# Patient Record
Sex: MALE | Race: WHITE | NOT HISPANIC OR LATINO | Employment: UNEMPLOYED | RURAL
[De-identification: names, ages, dates, MRNs, and addresses within clinical notes are randomized per-mention and may not be internally consistent; named-entity substitution may affect disease eponyms.]

---

## 2019-07-12 ENCOUNTER — TELEPHONE (OUTPATIENT)
Dept: FAMILY MEDICINE CLINIC | Facility: CLINIC | Age: 49
End: 2019-07-12

## 2019-07-18 PROBLEM — E11.9 TYPE 2 DIABETES MELLITUS WITHOUT COMPLICATION, WITHOUT LONG-TERM CURRENT USE OF INSULIN (HCC): Status: ACTIVE | Noted: 2019-07-18

## 2019-07-18 PROBLEM — I10 HYPERTENSION: Status: ACTIVE | Noted: 2019-07-18

## 2019-07-18 PROBLEM — L30.9 ECZEMA: Status: ACTIVE | Noted: 2019-07-18

## 2019-07-18 PROBLEM — Q40.1 CONGENITAL HIATUS HERNIA: Status: ACTIVE | Noted: 2019-07-18

## 2019-07-18 PROBLEM — E78.5 HYPERLIPIDEMIA: Status: ACTIVE | Noted: 2019-07-18

## 2019-07-18 PROBLEM — Z72.0 TOBACCO USE: Status: ACTIVE | Noted: 2019-07-18

## 2021-02-03 ENCOUNTER — HOSPITAL ENCOUNTER (OUTPATIENT)
Dept: GENERAL RADIOLOGY | Facility: HOSPITAL | Age: 51
Discharge: HOME | End: 2021-02-03

## 2021-02-03 ENCOUNTER — OFFICE VISIT (OUTPATIENT)
Dept: CARDIOLOGY | Facility: CLINIC | Age: 51
End: 2021-02-03

## 2021-02-03 ENCOUNTER — LAB (OUTPATIENT)
Dept: LAB | Facility: HOSPITAL | Age: 51
End: 2021-02-03

## 2021-02-03 ENCOUNTER — TRANSCRIBE ORDERS (OUTPATIENT)
Dept: ADMINISTRATIVE | Facility: HOSPITAL | Age: 51
End: 2021-02-03

## 2021-02-03 ENCOUNTER — TRANSCRIBE ORDERS (OUTPATIENT)
Dept: LAB | Facility: HOSPITAL | Age: 51
End: 2021-02-03

## 2021-02-03 VITALS
BODY MASS INDEX: 22.97 KG/M2 | HEART RATE: 130 BPM | DIASTOLIC BLOOD PRESSURE: 68 MMHG | WEIGHT: 179 LBS | SYSTOLIC BLOOD PRESSURE: 102 MMHG | HEIGHT: 74 IN

## 2021-02-03 DIAGNOSIS — E11.9 TYPE 2 DIABETES MELLITUS WITHOUT COMPLICATION, WITHOUT LONG-TERM CURRENT USE OF INSULIN (HCC): ICD-10-CM

## 2021-02-03 DIAGNOSIS — E11.65 UNCONTROLLED TYPE 2 DIABETES MELLITUS WITH HYPERGLYCEMIA (HCC): Primary | ICD-10-CM

## 2021-02-03 DIAGNOSIS — R94.31 ABNORMAL EKG: ICD-10-CM

## 2021-02-03 DIAGNOSIS — E78.2 MIXED HYPERLIPIDEMIA: ICD-10-CM

## 2021-02-03 DIAGNOSIS — R07.9 CHEST PAIN, UNSPECIFIED TYPE: ICD-10-CM

## 2021-02-03 DIAGNOSIS — R10.9 ABDOMINAL PAIN IN MALE: ICD-10-CM

## 2021-02-03 DIAGNOSIS — M54.50 ACUTE BILATERAL LOW BACK PAIN WITHOUT SCIATICA: ICD-10-CM

## 2021-02-03 DIAGNOSIS — R00.0 TACHYCARDIA: ICD-10-CM

## 2021-02-03 DIAGNOSIS — R10.9 ABDOMINAL PAIN IN MALE: Primary | ICD-10-CM

## 2021-02-03 DIAGNOSIS — R10.9 STOMACH ACHE: ICD-10-CM

## 2021-02-03 DIAGNOSIS — I10 ESSENTIAL HYPERTENSION: Primary | ICD-10-CM

## 2021-02-03 DIAGNOSIS — E11.65 UNCONTROLLED TYPE 2 DIABETES MELLITUS WITH HYPERGLYCEMIA (HCC): ICD-10-CM

## 2021-02-03 DIAGNOSIS — R06.02 SHORTNESS OF BREATH: ICD-10-CM

## 2021-02-03 LAB
ALBUMIN SERPL-MCNC: 4.6 G/DL (ref 3.5–5.2)
ALBUMIN/GLOB SERPL: 1.5 G/DL
ALP SERPL-CCNC: 126 U/L (ref 39–117)
ALT SERPL W P-5'-P-CCNC: 16 U/L (ref 1–41)
ANION GAP SERPL CALCULATED.3IONS-SCNC: 12.3 MMOL/L (ref 5–15)
AST SERPL-CCNC: 14 U/L (ref 1–40)
BASOPHILS # BLD AUTO: 0.06 10*3/MM3 (ref 0–0.2)
BASOPHILS NFR BLD AUTO: 0.5 % (ref 0–1.5)
BILIRUB SERPL-MCNC: 0.6 MG/DL (ref 0–1.2)
BUN SERPL-MCNC: 22 MG/DL (ref 6–20)
BUN/CREAT SERPL: 21.4 (ref 7–25)
CALCIUM SPEC-SCNC: 10.1 MG/DL (ref 8.6–10.5)
CHLORIDE SERPL-SCNC: 90 MMOL/L (ref 98–107)
CHOLEST SERPL-MCNC: 197 MG/DL (ref 0–200)
CO2 SERPL-SCNC: 33.7 MMOL/L (ref 22–29)
CREAT SERPL-MCNC: 1.03 MG/DL (ref 0.76–1.27)
DEPRECATED RDW RBC AUTO: 40.4 FL (ref 37–54)
EOSINOPHIL # BLD AUTO: 0.12 10*3/MM3 (ref 0–0.4)
EOSINOPHIL NFR BLD AUTO: 0.9 % (ref 0.3–6.2)
ERYTHROCYTE [DISTWIDTH] IN BLOOD BY AUTOMATED COUNT: 12.2 % (ref 12.3–15.4)
ERYTHROCYTE [SEDIMENTATION RATE] IN BLOOD: 3 MM/HR (ref 0–20)
GFR SERPL CREATININE-BSD FRML MDRD: 76 ML/MIN/1.73
GLOBULIN UR ELPH-MCNC: 3.1 GM/DL
GLUCOSE SERPL-MCNC: 379 MG/DL (ref 65–99)
HBA1C MFR BLD: 8.8 % (ref 3.5–5.6)
HCT VFR BLD AUTO: 50.5 % (ref 37.5–51)
HDLC SERPL-MCNC: 42 MG/DL (ref 40–60)
HGB BLD-MCNC: 17.2 G/DL (ref 13–17.7)
IMM GRANULOCYTES # BLD AUTO: 0.05 10*3/MM3 (ref 0–0.05)
IMM GRANULOCYTES NFR BLD AUTO: 0.4 % (ref 0–0.5)
LDLC SERPL CALC-MCNC: 100 MG/DL (ref 0–100)
LDLC/HDLC SERPL: 2.14 {RATIO}
LYMPHOCYTES # BLD AUTO: 2.01 10*3/MM3 (ref 0.7–3.1)
LYMPHOCYTES NFR BLD AUTO: 15.9 % (ref 19.6–45.3)
MCH RBC QN AUTO: 31.2 PG (ref 26.6–33)
MCHC RBC AUTO-ENTMCNC: 34.1 G/DL (ref 31.5–35.7)
MCV RBC AUTO: 91.5 FL (ref 79–97)
MONOCYTES # BLD AUTO: 1.07 10*3/MM3 (ref 0.1–0.9)
MONOCYTES NFR BLD AUTO: 8.4 % (ref 5–12)
NEUTROPHILS NFR BLD AUTO: 73.9 % (ref 42.7–76)
NEUTROPHILS NFR BLD AUTO: 9.36 10*3/MM3 (ref 1.7–7)
NRBC BLD AUTO-RTO: 0 /100 WBC (ref 0–0.2)
PLATELET # BLD AUTO: 219 10*3/MM3 (ref 140–450)
PMV BLD AUTO: 11.7 FL (ref 6–12)
POTASSIUM SERPL-SCNC: 4.1 MMOL/L (ref 3.5–5.2)
PROT SERPL-MCNC: 7.7 G/DL (ref 6–8.5)
RBC # BLD AUTO: 5.52 10*6/MM3 (ref 4.14–5.8)
SODIUM SERPL-SCNC: 136 MMOL/L (ref 136–145)
TRIGL SERPL-MCNC: 325 MG/DL (ref 0–150)
VLDLC SERPL-MCNC: 55 MG/DL (ref 5–40)
WBC # BLD AUTO: 12.67 10*3/MM3 (ref 3.4–10.8)

## 2021-02-03 PROCEDURE — 85025 COMPLETE CBC W/AUTO DIFF WBC: CPT

## 2021-02-03 PROCEDURE — 99204 OFFICE O/P NEW MOD 45 MIN: CPT | Performed by: INTERNAL MEDICINE

## 2021-02-03 PROCEDURE — 74018 RADEX ABDOMEN 1 VIEW: CPT

## 2021-02-03 PROCEDURE — 72100 X-RAY EXAM L-S SPINE 2/3 VWS: CPT

## 2021-02-03 PROCEDURE — 85652 RBC SED RATE AUTOMATED: CPT

## 2021-02-03 PROCEDURE — 80061 LIPID PANEL: CPT

## 2021-02-03 PROCEDURE — 83036 HEMOGLOBIN GLYCOSYLATED A1C: CPT

## 2021-02-03 PROCEDURE — 36415 COLL VENOUS BLD VENIPUNCTURE: CPT

## 2021-02-03 PROCEDURE — 80053 COMPREHEN METABOLIC PANEL: CPT

## 2021-02-03 RX ORDER — AMLODIPINE BESYLATE 2.5 MG/1
2.5 TABLET ORAL DAILY
COMMUNITY
End: 2021-02-03

## 2021-02-03 RX ORDER — GLIMEPIRIDE 2 MG/1
2 TABLET ORAL
COMMUNITY

## 2021-02-03 RX ORDER — LISINOPRIL AND HYDROCHLOROTHIAZIDE 25; 20 MG/1; MG/1
1 TABLET ORAL DAILY
COMMUNITY
End: 2021-02-03

## 2021-02-03 RX ORDER — BUPROPION HYDROCHLORIDE 150 MG/1
150 TABLET ORAL DAILY
COMMUNITY

## 2021-02-03 RX ORDER — LISINOPRIL AND HYDROCHLOROTHIAZIDE 25; 20 MG/1; MG/1
1 TABLET ORAL DAILY
Status: SHIPPED | COMMUNITY
Start: 2021-02-03

## 2021-02-03 NOTE — PROGRESS NOTES
Date of Office Visit: 2021  Encounter Provider: Dr. Winston Matos  Place of Service: [facility] CARDIOLOGY [place]  Patient Name: Winston Benites  :1970  Roxanna Portillo MD    Chief Complaint   Patient presents with   • Rapid Heart Rate     consult   • Chest Pain   • Hypertension   • Hyperlipidemia     History of Present Illness:    I am pleased to see Mr. Benites in my office today as a new consultation.    As you know, patient is 50-year-old white gentleman whose past medical history significant for hypertension, hyperlipidemia, diabetes mellitus, who is referred to me for symptom of chest pain and palpitation.    Patient has longstanding history of diabetes mellitus.  Patient reports that from last few weeks he is having symptom of chest pain.  Patient described this as a dull ache on the left side of the chest with radiation to the both shoulder and left arm.  There is no correlation with exertion.  Patient had few episode at rest as well as at exercise.  Patient denies any orthopnea PND syncope or presyncope.  No leg edema noted.  Patient denies any dizziness and lightheadedness.    Patient does not have previous history of CAD, PCI or MI.  Patient smokes half to 1 pack cigarettes per day.  He socially drinks.  He has strong family history for premature CAD in his father  of massive heart attack.    EKG done at PCP office shows sinus tachycardia with heart rate of 123 bpm.    Patient is presented with symptom of palpitation and chest pain.  His blood pressure is low.  I would recommend to discontinue amlodipine.  I would decrease lisinopril to 10 mg daily.  I would proceed with Lopressor 25 mg twice daily.  I would recommend to proceed with echocardiogram and stress test        Past Medical History:   Diagnosis Date   • Abnormal ECG    • Diabetes mellitus type 2, controlled (CMS/[facility])    • Eczema    • Hyperlipidemia    • Hypertension          Past Surgical History:   Procedure Laterality Date   •  "APPENDECTOMY     • HERNIA REPAIR             Current Outpatient Medications:   •  buPROPion XL (WELLBUTRIN XL) 150 MG 24 hr tablet, Take 150 mg by mouth Daily., Disp: , Rfl:   •  glimepiride (AMARYL) 2 MG tablet, Take 2 mg by mouth Every Morning Before Breakfast., Disp: , Rfl:   •  lisinopril-hydrochlorothiazide (PRINZIDE,ZESTORETIC) 20-25 MG per tablet, Take 0.5 tablets by mouth Daily., Disp: , Rfl:   •  metFORMIN (GLUCOPHAGE) 500 MG tablet, Take 500 mg by mouth 3 (Three) Times a Day., Disp: , Rfl:       Social History     Socioeconomic History   • Marital status: Single     Spouse name: Not on file   • Number of children: Not on file   • Years of education: Not on file   • Highest education level: Not on file   Tobacco Use   • Smoking status: Current Every Day Smoker     Packs/day: 1.00   • Smokeless tobacco: Never Used   Substance and Sexual Activity   • Alcohol use: Yes     Frequency: Never   • Drug use: No   • Sexual activity: Defer         Review of Systems   Constitution: Negative for chills and fever.   HENT: Negative for ear discharge and nosebleeds.    Eyes: Negative for discharge and redness.   Cardiovascular: Positive for chest pain and palpitations. Negative for orthopnea, paroxysmal nocturnal dyspnea and syncope.   Respiratory: Positive for shortness of breath. Negative for cough and wheezing.    Endocrine: Negative for heat intolerance.   Skin: Negative for rash.   Musculoskeletal: Negative for arthritis and myalgias.   Gastrointestinal: Negative for abdominal pain, melena, nausea and vomiting.   Genitourinary: Negative for dysuria and hematuria.   Neurological: Negative for dizziness, light-headedness, numbness and tremors.   Psychiatric/Behavioral: Negative for depression. The patient is not nervous/anxious.        Procedures    Procedures    No orders to display           Objective:    /68   Pulse (!) 130   Ht 188 cm (74.02\")   Wt 81.2 kg (179 lb)   BMI 22.97 kg/m²         Constitutional: "       Appearance: Well-developed.   Eyes:      General: No scleral icterus.        Right eye: No discharge.   HENT:      Head: Normocephalic and atraumatic.   Neck:      Thyroid: No thyromegaly.      Lymphadenopathy: No cervical adenopathy.   Pulmonary:      Effort: Pulmonary effort is normal. No respiratory distress.      Breath sounds: Normal breath sounds. No wheezing. No rales.   Cardiovascular:      Normal rate. Regular rhythm.      No gallop.   Abdominal:      Tenderness: There is no abdominal tenderness.   Skin:     Findings: No erythema or rash.   Neurological:      Mental Status: Alert and oriented to person, place, and time.             Assessment:       Diagnosis Plan   1. Essential hypertension  Adult Transthoracic Echo Complete W/ Cont if Necessary Per Protocol    Stress Test With Myocardial Perfusion One Day   2. Type 2 diabetes mellitus without complication, without long-term current use of insulin (CMS/HCC)  Adult Transthoracic Echo Complete W/ Cont if Necessary Per Protocol    Stress Test With Myocardial Perfusion One Day   3. Mixed hyperlipidemia  Adult Transthoracic Echo Complete W/ Cont if Necessary Per Protocol    Stress Test With Myocardial Perfusion One Day   4. Chest pain, unspecified type  Adult Transthoracic Echo Complete W/ Cont if Necessary Per Protocol    Stress Test With Myocardial Perfusion One Day   5. Abnormal EKG  Adult Transthoracic Echo Complete W/ Cont if Necessary Per Protocol    Stress Test With Myocardial Perfusion One Day   6. Tachycardia  Adult Transthoracic Echo Complete W/ Cont if Necessary Per Protocol    Stress Test With Myocardial Perfusion One Day   7. Shortness of breath  Adult Transthoracic Echo Complete W/ Cont if Necessary Per Protocol    Stress Test With Myocardial Perfusion One Day            Plan:       MDM/assessment and plan:    1.  Chest pain:    I would recommend to proceed with stress test with Cardiolite imaging.  Echocardiogram would be done    2.   Shortness of breath:    I would recommend that patient should proceed with echocardiogram.  Patient gives a history of previous cardiac murmur.    3.  Abnormal EKG:    Patient has underlying sinus tachycardia.  Etiology is unclear.  I would recommend echocardiogram to assess the left ventricle function and rule out cardiomyopathy    4.  Sinus tachycardia:    I would recommend to start Lopressor 25 mg twice daily.    5.  Hypertension:    I would like to add beta-blocker in the antihypertensive regimen.  I would recommend to decrease lisinopril.  Amlodipine would be discontinued.    6.  Diabetes mellitus:    Further recommendation as per PCP.  Diet modification discussed

## 2021-02-16 RX ORDER — ATENOLOL 25 MG/1
TABLET ORAL
COMMUNITY
Start: 2021-02-03 | End: 2021-03-19

## 2021-02-16 RX ORDER — ATORVASTATIN CALCIUM 10 MG/1
TABLET, FILM COATED ORAL
COMMUNITY
Start: 2021-02-03

## 2021-02-23 ENCOUNTER — HOSPITAL ENCOUNTER (OUTPATIENT)
Dept: NUCLEAR MEDICINE | Facility: HOSPITAL | Age: 51
Discharge: HOME | End: 2021-02-23

## 2021-02-23 ENCOUNTER — HOSPITAL ENCOUNTER (OUTPATIENT)
Dept: CARDIOLOGY | Facility: HOSPITAL | Age: 51
Setting detail: HOSPITAL OUTPATIENT SURGERY
Discharge: HOME | End: 2021-02-23
Admitting: INTERNAL MEDICINE

## 2021-02-23 VITALS
BODY MASS INDEX: 24.24 KG/M2 | WEIGHT: 179 LBS | HEIGHT: 72 IN | DIASTOLIC BLOOD PRESSURE: 100 MMHG | SYSTOLIC BLOOD PRESSURE: 153 MMHG

## 2021-02-23 DIAGNOSIS — E11.9 TYPE 2 DIABETES MELLITUS WITHOUT COMPLICATION, WITHOUT LONG-TERM CURRENT USE OF INSULIN (HCC): ICD-10-CM

## 2021-02-23 DIAGNOSIS — E78.2 MIXED HYPERLIPIDEMIA: ICD-10-CM

## 2021-02-23 DIAGNOSIS — I10 ESSENTIAL HYPERTENSION: ICD-10-CM

## 2021-02-23 DIAGNOSIS — R94.31 ABNORMAL EKG: ICD-10-CM

## 2021-02-23 DIAGNOSIS — R00.0 TACHYCARDIA: ICD-10-CM

## 2021-02-23 DIAGNOSIS — R06.02 SHORTNESS OF BREATH: ICD-10-CM

## 2021-02-23 DIAGNOSIS — R07.9 CHEST PAIN, UNSPECIFIED TYPE: ICD-10-CM

## 2021-02-23 PROCEDURE — 78452 HT MUSCLE IMAGE SPECT MULT: CPT | Performed by: INTERNAL MEDICINE

## 2021-02-23 PROCEDURE — 93306 TTE W/DOPPLER COMPLETE: CPT

## 2021-02-23 PROCEDURE — 0 TECHNETIUM SESTAMIBI: Performed by: INTERNAL MEDICINE

## 2021-02-23 PROCEDURE — A9500 TC99M SESTAMIBI: HCPCS | Performed by: INTERNAL MEDICINE

## 2021-02-23 PROCEDURE — 93018 CV STRESS TEST I&R ONLY: CPT | Performed by: NURSE PRACTITIONER

## 2021-02-23 PROCEDURE — 78452 HT MUSCLE IMAGE SPECT MULT: CPT

## 2021-02-23 PROCEDURE — 93306 TTE W/DOPPLER COMPLETE: CPT | Performed by: INTERNAL MEDICINE

## 2021-02-23 PROCEDURE — 93017 CV STRESS TEST TRACING ONLY: CPT

## 2021-02-23 RX ADMIN — TECHNETIUM TC 99M SESTAMIBI 1 DOSE: 1 INJECTION INTRAVENOUS at 09:20

## 2021-02-23 RX ADMIN — TECHNETIUM TC 99M SESTAMIBI 1 DOSE: 1 INJECTION INTRAVENOUS at 10:50

## 2021-02-25 LAB
BH CV STRESS BP STAGE 1: NORMAL
BH CV STRESS BP STAGE 2: NORMAL
BH CV STRESS BP STAGE 3: NORMAL
BH CV STRESS DURATION MIN STAGE 1: 3
BH CV STRESS DURATION MIN STAGE 2: 3
BH CV STRESS DURATION MIN STAGE 3: 3
BH CV STRESS DURATION SEC STAGE 1: 0
BH CV STRESS DURATION SEC STAGE 2: 0
BH CV STRESS DURATION SEC STAGE 3: 0
BH CV STRESS GRADE STAGE 1: 10
BH CV STRESS GRADE STAGE 2: 12
BH CV STRESS GRADE STAGE 3: 14
BH CV STRESS HR STAGE 1: 109
BH CV STRESS HR STAGE 2: 123
BH CV STRESS HR STAGE 3: 141
BH CV STRESS METS STAGE 1: 5
BH CV STRESS METS STAGE 2: 7.5
BH CV STRESS METS STAGE 3: 10
BH CV STRESS PROTOCOL 1: NORMAL
BH CV STRESS RECOVERY BP: NORMAL MMHG
BH CV STRESS RECOVERY HR: 103 BPM
BH CV STRESS SPEED STAGE 1: 1.7
BH CV STRESS SPEED STAGE 2: 2.5
BH CV STRESS SPEED STAGE 3: 3.4
BH CV STRESS STAGE 1: 1
BH CV STRESS STAGE 2: 2
BH CV STRESS STAGE 3: 3
LV EF NUC BP: 56 %
MAXIMAL PREDICTED HEART RATE: 169 BPM
PERCENT MAX PREDICTED HR: 83.43 %
STRESS BASELINE BP: NORMAL MMHG
STRESS BASELINE HR: 87 BPM
STRESS PERCENT HR: 98 %
STRESS POST EXERCISE DUR MIN: 7 MIN
STRESS POST EXERCISE DUR SEC: 30 SEC
STRESS POST PEAK BP: NORMAL MMHG
STRESS POST PEAK HR: 141 BPM
STRESS TARGET HR: 144 BPM

## 2021-02-26 ENCOUNTER — TELEPHONE (OUTPATIENT)
Dept: CARDIOLOGY | Facility: CLINIC | Age: 51
End: 2021-02-26

## 2021-02-28 LAB
BH CV ECHO MEAS - % LVPW THICK: 284.1 %
BH CV ECHO MEAS - ACS: 2.1 CM
BH CV ECHO MEAS - AO MAX PG (FULL): -0.05 MMHG
BH CV ECHO MEAS - AO MAX PG: 5.8 MMHG
BH CV ECHO MEAS - AO MEAN PG (FULL): -0.04 MMHG
BH CV ECHO MEAS - AO MEAN PG: 2.7 MMHG
BH CV ECHO MEAS - AO ROOT AREA (BSA CORRECTED): 1.8
BH CV ECHO MEAS - AO ROOT AREA: 10.5 CM^2
BH CV ECHO MEAS - AO ROOT DIAM: 3.7 CM
BH CV ECHO MEAS - AO V2 MAX: 120.7 CM/SEC
BH CV ECHO MEAS - AO V2 MEAN: 74 CM/SEC
BH CV ECHO MEAS - AO V2 VTI: 21.5 CM
BH CV ECHO MEAS - ASC AORTA: 3.7 CM
BH CV ECHO MEAS - AVA(I,A): 4.4 CM^2
BH CV ECHO MEAS - AVA(I,D): 4.4 CM^2
BH CV ECHO MEAS - AVA(V,A): 4.4 CM^2
BH CV ECHO MEAS - AVA(V,D): 4.4 CM^2
BH CV ECHO MEAS - BSA(HAYCOCK): 2 M^2
BH CV ECHO MEAS - BSA: 2 M^2
BH CV ECHO MEAS - BZI_BMI: 24.3 KILOGRAMS/M^2
BH CV ECHO MEAS - BZI_METRIC_HEIGHT: 182.9 CM
BH CV ECHO MEAS - BZI_METRIC_WEIGHT: 81.2 KG
BH CV ECHO MEAS - EDV(CUBED): 75.8 ML
BH CV ECHO MEAS - EDV(MOD-SP4): 90.9 ML
BH CV ECHO MEAS - EDV(TEICH): 80 ML
BH CV ECHO MEAS - EF(CUBED): 73.8 %
BH CV ECHO MEAS - EF(MOD-BP): 65 %
BH CV ECHO MEAS - EF(MOD-SP4): 65.3 %
BH CV ECHO MEAS - EF(TEICH): 66 %
BH CV ECHO MEAS - ESV(CUBED): 19.9 ML
BH CV ECHO MEAS - ESV(MOD-SP4): 31.6 ML
BH CV ECHO MEAS - ESV(TEICH): 27.2 ML
BH CV ECHO MEAS - FS: 36 %
BH CV ECHO MEAS - IVS/LVPW: 1.3
BH CV ECHO MEAS - IVSD: 1.4 CM
BH CV ECHO MEAS - LA DIMENSION(2D): 4 CM
BH CV ECHO MEAS - LA DIMENSION: 3.9 CM
BH CV ECHO MEAS - LA/AO: 1.1
BH CV ECHO MEAS - LV DIASTOLIC VOL/BSA (35-75): 44.7 ML/M^2
BH CV ECHO MEAS - LV MASS(C)D: 193.8 GRAMS
BH CV ECHO MEAS - LV MASS(C)DI: 95.4 GRAMS/M^2
BH CV ECHO MEAS - LV MAX PG: 5.9 MMHG
BH CV ECHO MEAS - LV MEAN PG: 2.7 MMHG
BH CV ECHO MEAS - LV SYSTOLIC VOL/BSA (12-30): 15.5 ML/M^2
BH CV ECHO MEAS - LV V1 MAX: 121.2 CM/SEC
BH CV ECHO MEAS - LV V1 MEAN: 77.8 CM/SEC
BH CV ECHO MEAS - LV V1 VTI: 21.2 CM
BH CV ECHO MEAS - LVIDD: 4.2 CM
BH CV ECHO MEAS - LVIDS: 2.7 CM
BH CV ECHO MEAS - LVOT AREA: 4.4 CM^2
BH CV ECHO MEAS - LVOT DIAM: 2.4 CM
BH CV ECHO MEAS - LVPWD: 1.1 CM
BH CV ECHO MEAS - LVPWS: 4.2 CM
BH CV ECHO MEAS - MR MAX PG: 3 MMHG
BH CV ECHO MEAS - MR MAX VEL: 87.2 CM/SEC
BH CV ECHO MEAS - MV A MAX VEL: 88 CM/SEC
BH CV ECHO MEAS - MV DEC SLOPE: 154.4 CM/SEC^2
BH CV ECHO MEAS - MV DEC TIME: 0.36 SEC
BH CV ECHO MEAS - MV E MAX VEL: 56.2 CM/SEC
BH CV ECHO MEAS - MV E/A: 0.64
BH CV ECHO MEAS - MV MAX PG: 3.2 MMHG
BH CV ECHO MEAS - MV MEAN PG: 1.5 MMHG
BH CV ECHO MEAS - MV V2 MAX: 89.6 CM/SEC
BH CV ECHO MEAS - MV V2 MEAN: 57.7 CM/SEC
BH CV ECHO MEAS - MV V2 VTI: 17.6 CM
BH CV ECHO MEAS - MVA(VTI): 5.3 CM^2
BH CV ECHO MEAS - PA ACC TIME: 0.11 SEC
BH CV ECHO MEAS - PA MAX PG (FULL): 0.95 MMHG
BH CV ECHO MEAS - PA MAX PG: 3.1 MMHG
BH CV ECHO MEAS - PA PR(ACCEL): 29.2 MMHG
BH CV ECHO MEAS - PA V2 MAX: 88.4 CM/SEC
BH CV ECHO MEAS - PI END-D VEL: 111.8 CM/SEC
BH CV ECHO MEAS - PULM DIAS VEL: 38.8 CM/SEC
BH CV ECHO MEAS - PULM S/D: 1.4
BH CV ECHO MEAS - PULM SYS VEL: 55.3 CM/SEC
BH CV ECHO MEAS - PVA(V,A): 4.5 CM^2
BH CV ECHO MEAS - PVA(V,D): 4.5 CM^2
BH CV ECHO MEAS - QP/QS: 0.8
BH CV ECHO MEAS - RAP SYSTOLE: 3 MMHG
BH CV ECHO MEAS - RV MAX PG: 2.2 MMHG
BH CV ECHO MEAS - RV MEAN PG: 1.1 MMHG
BH CV ECHO MEAS - RV V1 MAX: 73.8 CM/SEC
BH CV ECHO MEAS - RV V1 MEAN: 48.4 CM/SEC
BH CV ECHO MEAS - RV V1 VTI: 13.9 CM
BH CV ECHO MEAS - RVDD: 3.5 CM
BH CV ECHO MEAS - RVOT AREA: 5.4 CM^2
BH CV ECHO MEAS - RVOT DIAM: 2.6 CM
BH CV ECHO MEAS - RVSP: 6.9 MMHG
BH CV ECHO MEAS - SI(AO): 111.1 ML/M^2
BH CV ECHO MEAS - SI(CUBED): 27.5 ML/M^2
BH CV ECHO MEAS - SI(LVOT): 46.1 ML/M^2
BH CV ECHO MEAS - SI(MOD-SP4): 29.2 ML/M^2
BH CV ECHO MEAS - SI(TEICH): 26 ML/M^2
BH CV ECHO MEAS - SV(AO): 225.8 ML
BH CV ECHO MEAS - SV(CUBED): 55.9 ML
BH CV ECHO MEAS - SV(LVOT): 93.6 ML
BH CV ECHO MEAS - SV(MOD-SP4): 59.3 ML
BH CV ECHO MEAS - SV(RVOT): 74.7 ML
BH CV ECHO MEAS - SV(TEICH): 52.8 ML
BH CV ECHO MEAS - TR MAX VEL: 98.4 CM/SEC

## 2021-03-01 ENCOUNTER — TELEPHONE (OUTPATIENT)
Dept: CARDIOLOGY | Facility: CLINIC | Age: 51
End: 2021-03-01

## 2021-03-18 RX ORDER — NICOTINE POLACRILEX 4 MG/1
1 GUM, CHEWING ORAL DAILY
COMMUNITY
Start: 2021-02-18

## 2021-03-18 RX ORDER — NABUMETONE 500 MG/1
500 TABLET, FILM COATED ORAL 2 TIMES DAILY
COMMUNITY
Start: 2021-02-17 | End: 2021-03-19

## 2021-03-18 RX ORDER — LINACLOTIDE 145 UG/1
CAPSULE, GELATIN COATED ORAL
COMMUNITY
Start: 2021-02-24

## 2021-03-18 NOTE — PROGRESS NOTES
Date of Office Visit: 2021  Encounter Provider: Dr. Winston Matos  Place of Service: [facility] CARDIOLOGY [place]  Patient Name: Winston Benites  :1970  Roxanna Portillo MD    Chief Complaint   Patient presents with   • Rapid Heart Rate     6 week follow up/stress/echo   • Hypertension   • Hyperlipidemia   • Chest Pain     History of Present Illness    I am pleased to see Mr. Benites in my office today as a follow-up    As you know, patient is 50-year-old white gentleman whose past medical history significant for hypertension, hyperlipidemia, diabetes mellitus, who i came today for follow-up.    In 2021, patient was presented to me for symptom of chest discomfort.  Patient underwent stress test in which he walked for total of 7 minutes and 30 seconds.  Target heart rate was achieved.  Cardiolite imaging showed no myocardial ischemia or infarction.  Echocardiogram showed preserved left ventricle function EF was 60 to 65%.    Since the previous visit, patient is overall feeling better.  His symptom of chest discomfort has improved.  Shortness of breath is stable.  Patient denies any orthopnea, PND, syncope or presyncope.  No leg edema noted.  No palpitation or dizziness.    His blood pressure is still elevated.  I would increase Lopressor to 50 mg twice daily.  Blood pressure monitoring is recommended.  I will see the patient in 6 months.  Patient is advised to bring the logbook.          Past Medical History:   Diagnosis Date   • Abnormal ECG    • Diabetes mellitus type 2, controlled (CMS/HCC)    • Eczema    • Hyperlipidemia    • Hypertension          Past Surgical History:   Procedure Laterality Date   • APPENDECTOMY     • HERNIA REPAIR             Current Outpatient Medications:   •  atorvastatin (LIPITOR) 10 MG tablet, , Disp: , Rfl:   •  buPROPion XL (WELLBUTRIN XL) 150 MG 24 hr tablet, Take 150 mg by mouth Daily., Disp: , Rfl:   •  glimepiride (AMARYL) 2 MG tablet, Take 2 mg by mouth Every  Morning Before Breakfast., Disp: , Rfl:   •  Linzess 145 MCG capsule capsule, TAKE 1 CAPSULE BY MOUTH ONCE DAILY ON AN EMPTY STOMACH AT LEAST 30 MINUTES BEFORE 1ST MEAL OF THE DAY, Disp: , Rfl:   •  lisinopril-hydrochlorothiazide (PRINZIDE,ZESTORETIC) 20-25 MG per tablet, Take 1 tablet by mouth Daily., Disp: , Rfl:   •  metFORMIN (GLUCOPHAGE) 1000 MG tablet, TAKE 1 TABLET BY MOUTH TWICE DAILY WITH MORNING MEAL AND WITH EVENING MEAL, Disp: , Rfl:   •  metoprolol tartrate (LOPRESSOR) 50 MG tablet, Take 1 tablet by mouth 2 (Two) Times a Day., Disp: 180 tablet, Rfl: 1  •  Omeprazole 20 MG tablet delayed-release, Take 1 tablet by mouth Daily., Disp: , Rfl:       Social History     Socioeconomic History   • Marital status: Single     Spouse name: Not on file   • Number of children: Not on file   • Years of education: Not on file   • Highest education level: Not on file   Tobacco Use   • Smoking status: Current Every Day Smoker     Packs/day: 1.00   • Smokeless tobacco: Never Used   • Tobacco comment: Patient is advised to quit smoking   Vaping Use   • Vaping Use: Never used   Substance and Sexual Activity   • Alcohol use: Yes   • Drug use: No   • Sexual activity: Defer         Review of Systems   Constitutional: Negative for chills and fever.   HENT: Negative for ear discharge and nosebleeds.    Eyes: Negative for discharge and redness.   Cardiovascular: Negative for chest pain, orthopnea, palpitations, paroxysmal nocturnal dyspnea and syncope.   Respiratory: Positive for shortness of breath. Negative for cough and wheezing.    Endocrine: Negative for heat intolerance.   Skin: Negative for rash.   Musculoskeletal: Negative for arthritis and myalgias.   Gastrointestinal: Negative for abdominal pain, melena, nausea and vomiting.   Genitourinary: Negative for dysuria and hematuria.   Neurological: Negative for dizziness, light-headedness, numbness and tremors.   Psychiatric/Behavioral: Negative for depression. The patient is  "not nervous/anxious.        Procedures    Procedures    No orders to display           Objective:    /93   Pulse 88   Ht 182.9 cm (72.01\")   Wt 81.2 kg (179 lb)   BMI 24.27 kg/m²         Constitutional:       Appearance: Well-developed.   Eyes:      General: No scleral icterus.        Right eye: No discharge.   HENT:      Head: Normocephalic and atraumatic.   Neck:      Thyroid: No thyromegaly.      Lymphadenopathy: No cervical adenopathy.   Pulmonary:      Effort: Pulmonary effort is normal. No respiratory distress.      Breath sounds: Normal breath sounds. No wheezing. No rales.   Cardiovascular:      Normal rate. Regular rhythm.      No gallop.   Abdominal:      Tenderness: There is no abdominal tenderness.   Skin:     Findings: No erythema or rash.   Neurological:      Mental Status: Alert and oriented to person, place, and time.             Assessment:       Diagnosis Plan   1. Essential hypertension  metoprolol tartrate (LOPRESSOR) 50 MG tablet   2. Type 2 diabetes mellitus without complication, without long-term current use of insulin (CMS/HCC)  metoprolol tartrate (LOPRESSOR) 50 MG tablet   3. Mixed hyperlipidemia  metoprolol tartrate (LOPRESSOR) 50 MG tablet   4. Chest pain, unspecified type  metoprolol tartrate (LOPRESSOR) 50 MG tablet   5. Abnormal EKG  metoprolol tartrate (LOPRESSOR) 50 MG tablet   6. Tachycardia  metoprolol tartrate (LOPRESSOR) 50 MG tablet            Plan:       MDM:    1.  Chest pain:    Patient chest pain has improved.  Stress test is negative.  Recommend observation    2.  Sinus tachycardia:    With addition of Lopressor, heart rate is improved but it is still elevated.  I would recommend to increase Lopressor to 50 mg twice daily    3.  Hypertension:    Blood pressure is still high I would recommend to start increase Lopressor.    4.  Diabetes mellitus:    Patient is advised to have a diet modification.  Exercise recommended.  "

## 2021-03-19 ENCOUNTER — OFFICE VISIT (OUTPATIENT)
Dept: CARDIOLOGY | Facility: CLINIC | Age: 51
End: 2021-03-19

## 2021-03-19 VITALS
SYSTOLIC BLOOD PRESSURE: 146 MMHG | DIASTOLIC BLOOD PRESSURE: 93 MMHG | BODY MASS INDEX: 24.24 KG/M2 | WEIGHT: 179 LBS | HEIGHT: 72 IN | HEART RATE: 88 BPM

## 2021-03-19 DIAGNOSIS — R00.0 TACHYCARDIA: ICD-10-CM

## 2021-03-19 DIAGNOSIS — R07.9 CHEST PAIN, UNSPECIFIED TYPE: ICD-10-CM

## 2021-03-19 DIAGNOSIS — R94.31 ABNORMAL EKG: ICD-10-CM

## 2021-03-19 DIAGNOSIS — E78.2 MIXED HYPERLIPIDEMIA: ICD-10-CM

## 2021-03-19 DIAGNOSIS — I10 ESSENTIAL HYPERTENSION: Primary | ICD-10-CM

## 2021-03-19 DIAGNOSIS — E11.9 TYPE 2 DIABETES MELLITUS WITHOUT COMPLICATION, WITHOUT LONG-TERM CURRENT USE OF INSULIN (HCC): ICD-10-CM

## 2021-03-19 PROCEDURE — 99214 OFFICE O/P EST MOD 30 MIN: CPT | Performed by: INTERNAL MEDICINE

## 2021-03-19 RX ORDER — METOPROLOL TARTRATE 50 MG/1
50 TABLET, FILM COATED ORAL 2 TIMES DAILY
Qty: 180 TABLET | Refills: 1 | Status: SHIPPED | OUTPATIENT
Start: 2021-03-19

## 2022-06-10 DIAGNOSIS — E11.9 TYPE 2 DIABETES MELLITUS WITHOUT COMPLICATION, WITHOUT LONG-TERM CURRENT USE OF INSULIN: ICD-10-CM

## 2022-06-10 DIAGNOSIS — R07.9 CHEST PAIN, UNSPECIFIED TYPE: ICD-10-CM

## 2022-06-10 DIAGNOSIS — E78.2 MIXED HYPERLIPIDEMIA: ICD-10-CM

## 2022-06-10 DIAGNOSIS — R00.0 TACHYCARDIA: ICD-10-CM

## 2022-06-10 DIAGNOSIS — I10 ESSENTIAL HYPERTENSION: ICD-10-CM

## 2022-06-10 DIAGNOSIS — R94.31 ABNORMAL EKG: ICD-10-CM

## 2022-06-10 RX ORDER — METOPROLOL TARTRATE 50 MG/1
TABLET, FILM COATED ORAL
Qty: 180 TABLET | Refills: 0 | OUTPATIENT
Start: 2022-06-10

## 2023-02-27 ENCOUNTER — OFFICE (OUTPATIENT)
Dept: URBAN - METROPOLITAN AREA CLINIC 64 | Facility: CLINIC | Age: 53
End: 2023-02-27

## 2023-02-27 VITALS
WEIGHT: 160 LBS | HEART RATE: 97 BPM | HEIGHT: 73 IN | SYSTOLIC BLOOD PRESSURE: 177 MMHG | DIASTOLIC BLOOD PRESSURE: 114 MMHG

## 2023-02-27 DIAGNOSIS — E11.9 TYPE 2 DIABETES MELLITUS WITHOUT COMPLICATIONS: ICD-10-CM

## 2023-02-27 DIAGNOSIS — Z12.11 ENCOUNTER FOR SCREENING FOR MALIGNANT NEOPLASM OF COLON: ICD-10-CM

## 2023-02-27 DIAGNOSIS — R16.0 HEPATOMEGALY, NOT ELSEWHERE CLASSIFIED: ICD-10-CM

## 2023-02-27 PROCEDURE — 99204 OFFICE O/P NEW MOD 45 MIN: CPT | Performed by: INTERNAL MEDICINE
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
## 2023-10-25 PROBLEM — Z76.89 ENCOUNTER TO ESTABLISH CARE: Status: ACTIVE | Noted: 2023-10-25

## 2023-10-26 ENCOUNTER — OFFICE VISIT (OUTPATIENT)
Dept: FAMILY MEDICINE CLINIC | Facility: CLINIC | Age: 53
End: 2023-10-26
Payer: COMMERCIAL

## 2023-10-26 ENCOUNTER — TELEPHONE (OUTPATIENT)
Dept: FAMILY MEDICINE CLINIC | Facility: CLINIC | Age: 53
End: 2023-10-26

## 2023-10-26 VITALS
OXYGEN SATURATION: 96 % | HEART RATE: 116 BPM | BODY MASS INDEX: 19.1 KG/M2 | WEIGHT: 141 LBS | DIASTOLIC BLOOD PRESSURE: 80 MMHG | SYSTOLIC BLOOD PRESSURE: 110 MMHG | RESPIRATION RATE: 18 BRPM | HEIGHT: 72 IN

## 2023-10-26 DIAGNOSIS — G44.311 INTRACTABLE ACUTE POST-TRAUMATIC HEADACHE: ICD-10-CM

## 2023-10-26 DIAGNOSIS — E78.2 MIXED HYPERLIPIDEMIA: ICD-10-CM

## 2023-10-26 DIAGNOSIS — R00.0 TACHYCARDIA: ICD-10-CM

## 2023-10-26 DIAGNOSIS — Z12.11 SCREENING FOR COLON CANCER: ICD-10-CM

## 2023-10-26 DIAGNOSIS — K13.21 LEUKOPLAKIA OF ORAL MUCOSA: ICD-10-CM

## 2023-10-26 DIAGNOSIS — Z13.21 ENCOUNTER FOR VITAMIN DEFICIENCY SCREENING: ICD-10-CM

## 2023-10-26 DIAGNOSIS — Z76.89 ENCOUNTER TO ESTABLISH CARE: Primary | ICD-10-CM

## 2023-10-26 DIAGNOSIS — Z72.0 TOBACCO USE: ICD-10-CM

## 2023-10-26 DIAGNOSIS — Z13.29 SCREENING FOR THYROID DISORDER: ICD-10-CM

## 2023-10-26 DIAGNOSIS — F41.9 ANXIETY: ICD-10-CM

## 2023-10-26 DIAGNOSIS — Z12.5 SCREENING FOR PROSTATE CANCER: ICD-10-CM

## 2023-10-26 DIAGNOSIS — E11.65 UNCONTROLLED TYPE 2 DIABETES MELLITUS WITH HYPERGLYCEMIA: ICD-10-CM

## 2023-10-26 DIAGNOSIS — Z11.59 ENCOUNTER FOR HEPATITIS C SCREENING TEST FOR LOW RISK PATIENT: ICD-10-CM

## 2023-10-26 DIAGNOSIS — S09.90XD TRAUMATIC INJURY OF HEAD, SUBSEQUENT ENCOUNTER: ICD-10-CM

## 2023-10-26 DIAGNOSIS — I10 ESSENTIAL HYPERTENSION: ICD-10-CM

## 2023-10-26 PROBLEM — K42.9 HERNIA, UMBILICAL: Status: ACTIVE | Noted: 2023-10-26

## 2023-10-26 PROBLEM — R01.1 HEART MURMUR: Status: ACTIVE | Noted: 2023-10-26

## 2023-10-26 PROBLEM — N20.0 KIDNEY STONES: Status: ACTIVE | Noted: 2023-10-26

## 2023-10-26 PROBLEM — S09.90XA HEAD TRAUMA: Status: ACTIVE | Noted: 2023-10-26

## 2023-10-26 LAB
BILIRUB BLD-MCNC: NEGATIVE MG/DL
CLARITY, POC: CLEAR
COLOR UR: YELLOW
EXPIRATION DATE: ABNORMAL
GLUCOSE UR STRIP-MCNC: ABNORMAL MG/DL
HBA1C MFR BLD: 14 % (ref 4.5–5.7)
KETONES UR QL: ABNORMAL
LEUKOCYTE EST, POC: NEGATIVE
Lab: ABNORMAL
NITRITE UR-MCNC: NEGATIVE MG/ML
PH UR: 6 [PH] (ref 5–8)
PROT UR STRIP-MCNC: ABNORMAL MG/DL
RBC # UR STRIP: NEGATIVE /UL
SP GR UR: 1.02 (ref 1–1.03)
UROBILINOGEN UR QL: NORMAL

## 2023-10-26 RX ORDER — AMLODIPINE BESYLATE 2.5 MG/1
2.5 TABLET ORAL DAILY
COMMUNITY
End: 2023-10-27 | Stop reason: SDUPTHER

## 2023-10-26 RX ORDER — ATORVASTATIN CALCIUM 40 MG/1
40 TABLET, FILM COATED ORAL NIGHTLY
Qty: 90 TABLET | Refills: 0 | Status: SHIPPED | OUTPATIENT
Start: 2023-10-26 | End: 2023-10-27 | Stop reason: SDUPTHER

## 2023-10-26 RX ORDER — ALBUTEROL SULFATE 90 UG/1
2 AEROSOL, METERED RESPIRATORY (INHALATION) EVERY 4 HOURS PRN
Qty: 6.7 G | Refills: 0 | Status: SHIPPED | OUTPATIENT
Start: 2023-10-26 | End: 2023-10-27 | Stop reason: SDUPTHER

## 2023-10-26 RX ORDER — LINACLOTIDE 145 UG/1
145 CAPSULE, GELATIN COATED ORAL
Qty: 90 CAPSULE | Refills: 0 | Status: SHIPPED | OUTPATIENT
Start: 2023-10-26 | End: 2023-10-27 | Stop reason: SDUPTHER

## 2023-10-26 RX ORDER — DULAGLUTIDE 0.75 MG/.5ML
0.75 INJECTION, SOLUTION SUBCUTANEOUS WEEKLY
Qty: 2 ML | Refills: 2 | Status: SHIPPED | OUTPATIENT
Start: 2023-10-26 | End: 2023-10-27 | Stop reason: SDUPTHER

## 2023-10-26 RX ORDER — ESCITALOPRAM OXALATE 10 MG/1
10 TABLET ORAL DAILY
Qty: 90 TABLET | Refills: 0 | Status: SHIPPED | OUTPATIENT
Start: 2023-10-26 | End: 2023-10-27 | Stop reason: SDUPTHER

## 2023-10-26 RX ORDER — ESCITALOPRAM OXALATE 5 MG/1
5 TABLET ORAL DAILY
COMMUNITY
End: 2023-10-26 | Stop reason: SDUPTHER

## 2023-10-26 NOTE — ASSESSMENT & PLAN NOTE
Previously under the care of Dr. Matos.  Last visit 2021.  Blood pressure controlled 110/80. Start lisinopril 5 mg.

## 2023-10-26 NOTE — ASSESSMENT & PLAN NOTE
A1c will not register   - notes greater than 14.  Random blood glucose 301 in office today.  Patient states diabetes uncontrolled.   6/22/2023 glucose 500, glucosuria greater than thousand  10/26/2023: Refill for Trulicity will be sent. Refill for metformin 1000 mg twice daily in the morning and at night will be sent. Take with meals. Order will be placed for a Freestyle Jah and sensor. Begin Jardiance 10 mg daily. Continue Trulicity 0.75 mg once weekly. Referral will be placed to endocrinology. Instructed to check blood glucose 4 times daily before breakfast, lunch, dinner and at bedtime. Limit sugar, carbohydrates, potatoes, and bread.   Increase lean meats that are baked, boiled, or grilled such as chicken, turkey, and fish.  Will begin diabetes education classes.    Follow up in 3 months for a hemoglobin A1c.

## 2023-10-26 NOTE — PATIENT INSTRUCTIONS
Please have blood work completed.  Orders are at [facility].    Return for annual physical exam.  Continue plan of care as discussed.     Continue current plan of care as discussed.   Take medication as ordered (if applicable).    Practice good sleep hygiene.  Eat a well balanced diet with fresh fruit and vegetables.    Drink at least 8 bottles of water or equivalent per day.     Limit sweetened beverages, sodas, juices.    Bake, boil, broil or grill your food, avoid eating fried foods.   Exercise at least 150 minutes per week.

## 2023-10-26 NOTE — TELEPHONE ENCOUNTER
Pharmacy Name: [id number]  [place], [place] - [address]   - [phone]  - [phone]     What medication are you calling in regards to: ALL MEDICATIONS ON FILE FROM [facility]    What question does the pharmacy have: REQUESTING ALL SCRIPTS TRANSFERRED TO [facility] AS MAIN PHARMACY

## 2023-10-27 DIAGNOSIS — F41.9 ANXIETY: ICD-10-CM

## 2023-10-27 DIAGNOSIS — E78.2 MIXED HYPERLIPIDEMIA: ICD-10-CM

## 2023-10-27 DIAGNOSIS — E11.65 UNCONTROLLED TYPE 2 DIABETES MELLITUS WITH HYPERGLYCEMIA: ICD-10-CM

## 2023-10-27 DIAGNOSIS — I10 ESSENTIAL HYPERTENSION: ICD-10-CM

## 2023-10-27 DIAGNOSIS — R00.0 TACHYCARDIA: ICD-10-CM

## 2023-10-27 LAB — MICROALBUMIN UR-MCNC: 51.9 UG/ML

## 2023-10-27 RX ORDER — ALBUTEROL SULFATE 90 UG/1
2 AEROSOL, METERED RESPIRATORY (INHALATION) EVERY 4 HOURS PRN
Qty: 6.7 G | Refills: 0 | Status: SHIPPED | OUTPATIENT
Start: 2023-10-27

## 2023-10-27 RX ORDER — ESCITALOPRAM OXALATE 10 MG/1
10 TABLET ORAL DAILY
Qty: 90 TABLET | Refills: 0 | Status: SHIPPED | OUTPATIENT
Start: 2023-10-27

## 2023-10-27 RX ORDER — LINACLOTIDE 145 UG/1
145 CAPSULE, GELATIN COATED ORAL
Qty: 90 CAPSULE | Refills: 0 | Status: SHIPPED | OUTPATIENT
Start: 2023-10-27 | End: 2023-10-27 | Stop reason: SDUPTHER

## 2023-10-27 RX ORDER — AMLODIPINE BESYLATE 2.5 MG/1
2.5 TABLET ORAL DAILY
Qty: 30 TABLET | Refills: 1 | Status: SHIPPED | OUTPATIENT
Start: 2023-10-27

## 2023-10-27 RX ORDER — LINACLOTIDE 145 UG/1
145 CAPSULE, GELATIN COATED ORAL
Qty: 90 CAPSULE | Refills: 0 | Status: SHIPPED | OUTPATIENT
Start: 2023-10-27

## 2023-10-27 RX ORDER — ESCITALOPRAM OXALATE 10 MG/1
10 TABLET ORAL DAILY
Qty: 90 TABLET | Refills: 0 | Status: SHIPPED | OUTPATIENT
Start: 2023-10-27 | End: 2023-10-27 | Stop reason: SDUPTHER

## 2023-10-27 RX ORDER — DULAGLUTIDE 0.75 MG/.5ML
0.75 INJECTION, SOLUTION SUBCUTANEOUS WEEKLY
Qty: 2 ML | Refills: 2 | Status: SHIPPED | OUTPATIENT
Start: 2023-10-27 | End: 2023-10-27 | Stop reason: SDUPTHER

## 2023-10-27 RX ORDER — ALBUTEROL SULFATE 90 UG/1
2 AEROSOL, METERED RESPIRATORY (INHALATION) EVERY 4 HOURS PRN
Qty: 6.7 G | Refills: 0 | Status: SHIPPED | OUTPATIENT
Start: 2023-10-27 | End: 2023-10-27 | Stop reason: SDUPTHER

## 2023-10-27 RX ORDER — DULAGLUTIDE 0.75 MG/.5ML
0.75 INJECTION, SOLUTION SUBCUTANEOUS WEEKLY
Qty: 2 ML | Refills: 2 | Status: SHIPPED | OUTPATIENT
Start: 2023-10-27

## 2023-10-27 RX ORDER — ATORVASTATIN CALCIUM 40 MG/1
40 TABLET, FILM COATED ORAL NIGHTLY
Qty: 90 TABLET | Refills: 0 | Status: SHIPPED | OUTPATIENT
Start: 2023-10-27 | End: 2023-10-27 | Stop reason: SDUPTHER

## 2023-10-27 RX ORDER — BUPROPION HYDROCHLORIDE 150 MG/1
150 TABLET ORAL DAILY
Qty: 30 TABLET | Refills: 1 | Status: SHIPPED | OUTPATIENT
Start: 2023-10-27

## 2023-10-27 RX ORDER — ATORVASTATIN CALCIUM 40 MG/1
40 TABLET, FILM COATED ORAL NIGHTLY
Qty: 90 TABLET | Refills: 0 | Status: SHIPPED | OUTPATIENT
Start: 2023-10-27

## 2023-10-27 NOTE — ASSESSMENT & PLAN NOTE
Discontinue amlodipine. Decrease metoprolol 50 mg twice daily to 25 mg twice daily. Recommended healthy diet and exercise.

## 2023-10-27 NOTE — ASSESSMENT & PLAN NOTE
Lipid abnormalities are unchanged.  Nutritional counseling was provided.  Lipids will be reassessed in 6 months.    Increase atorvastatin from 10 mg to 40 mg.

## 2023-11-05 NOTE — ASSESSMENT & PLAN NOTE
Has reduced his cigarette smoking to half of 1 pack daily for the last 1 or 2 months.  Smoked 1 pack daily for 32 years.    Was around secondhand smoke while growing up.   His mother smoked.   Never had a CT scan for lung cancer screening.   Does not wake up in the morning with a cough.  Occasional wheezing from smoking.

## 2023-11-05 NOTE — ASSESSMENT & PLAN NOTE
Blood pressure 110/80 mmHg.    No longer seeing cardiologist, Dr. Winston Matos.  Denies chest pain, palpitations, or swelling in his legs or feet.   Has tachycardia.

## 2024-04-04 ENCOUNTER — OFFICE VISIT (OUTPATIENT)
Dept: FAMILY MEDICINE CLINIC | Facility: CLINIC | Age: 54
End: 2024-04-04
Payer: MEDICAID

## 2024-04-04 VITALS — RESPIRATION RATE: 18 BRPM | BODY MASS INDEX: 19.48 KG/M2 | HEIGHT: 72 IN | WEIGHT: 143.8 LBS

## 2024-04-04 DIAGNOSIS — Z00.00 HEALTH MAINTENANCE EXAMINATION: Primary | ICD-10-CM

## 2024-04-04 DIAGNOSIS — Z12.11 SCREENING FOR COLON CANCER: ICD-10-CM

## 2024-04-04 DIAGNOSIS — E11.9 TYPE 2 DIABETES MELLITUS WITHOUT COMPLICATION, WITHOUT LONG-TERM CURRENT USE OF INSULIN: ICD-10-CM

## 2024-04-04 NOTE — PROGRESS NOTES
"    Office Note     Name: Winston Benites    : 1970     MRN: [id number]     Chief Complaint  No chief complaint on file.    Subjective     History of Present Illness:  Winston Benites is a 54 y.o. male who presents today for annual physical exam     History of Present Illness    He has never had a colonoscopy.  Declines the influenza vaccine, HIV, and STD screening.   Mild alcohol consumption, up to 2 drinks weekly.   Denies any marijuana or drug usage.       Preventive Health/Lifestyle:      General Health: fair  Nutrition: in general, an \"unhealthy\" diet  Activity level is moderate.   Exercises 5 per week.  Energy level is poor.  Sleep: poorly  Hours of Sleep: 5  Sleep Apnea or Snoring:   Skin Self Exam: occasionally  Libido: good     Last Physical Exam: About a year ago for work      Last tetanus shot was  Less than a year ago  .      Prostate Cancer Screening: None      Colonoscopy:   None     No Known Allergies      Current Outpatient Medications:     albuterol sulfate  (90 Base) MCG/ACT inhaler, Inhale 2 puffs Every 4 (Four) Hours As Needed for Wheezing., Disp: 6.7 g, Rfl: 0    amLODIPine (NORVASC) 2.5 MG tablet, Take 1 tablet by mouth Daily., Disp: 30 tablet, Rfl: 1    atorvastatin (LIPITOR) 40 MG tablet, Take 1 tablet by mouth Every Night., Disp: 90 tablet, Rfl: 0    buPROPion XL (WELLBUTRIN XL) 150 MG 24 hr tablet, Take 1 tablet by mouth Daily., Disp: 30 tablet, Rfl: 1    Continuous Blood Gluc  (FreeStyle Jah 2 [place]) device, Use 1 each Daily. To be used daily to monitor continuous blood sugar reading for type 2 diabetic patient on insulin regimen., Disp: 1 each, Rfl: 0    Continuous Blood Gluc Sensor (FreeStyle Jah 2 Sensor) misc, Use 1 each Every 14 (Fourteen) Days. Dispense 2 sensor pack. To be used daily with freestyle jah device to monitor continuous blood sugar reading for type 2 diabetic patient on insulin regimen., Disp: 2 each, Rfl: 12    Dulaglutide (Trulicity) 0.75 MG/0.5ML " solution pen-injector, Inject 0.75 mg under the skin into the appropriate area as directed 1 (One) Time Per Week., Disp: 2 mL, Rfl: 2    empagliflozin (Jardiance) 10 MG tablet tablet, Take 1 tablet by mouth Daily., Disp: 30 tablet, Rfl: 12    escitalopram (LEXAPRO) 10 MG tablet, Take 1 tablet by mouth Daily., Disp: 90 tablet, Rfl: 0    Linzess 145 MCG capsule capsule, Take 1 capsule by mouth Every Morning Before Breakfast., Disp: 90 capsule, Rfl: 0    lisinopril 10 MG tablet 20 mg, hydroCHLOROthiazide 25 MG tablet 25 mg, Take 1 dose by mouth Daily., Disp: , Rfl:     metFORMIN (GLUCOPHAGE) 1000 MG tablet, Take 1 tablet by mouth 2 (Two) Times a Day With Meals., Disp: 180 tablet, Rfl: 0    metoprolol tartrate (LOPRESSOR) 25 MG tablet, Take 1 tablet by mouth 2 (Two) Times a Day., Disp: 180 tablet, Rfl: 0    nystatin (MYCOSTATIN) 100,000 unit/mL suspension, Swish and swallow 5 mL 4 (Four) Times a Day., Disp: 60 mL, Rfl: 0    Past Medical History:   Diagnosis Date    Abnormal ECG     Diabetes mellitus type 2, controlled     Eczema     Hyperlipidemia     Hypertension        Review of Systems    Social History     Socioeconomic History    Marital status: Single   Tobacco Use    Smoking status: Every Day     Current packs/day: 0.50     Types: Cigarettes    Smokeless tobacco: Never    Tobacco comments:     Patient is not inclined but advised to quit smoking   Vaping Use    Vaping status: Never Used   Substance and Sexual Activity    Alcohol use: Yes    Drug use: No    Sexual activity: Defer       Family History   Problem Relation Age of Onset    Lung disease Mother     Heart disease Father     Breast cancer Sister            10/26/2023     1:24 PM   PHQ-2/PHQ-9 Depression Screening   Little Interest or Pleasure in Doing Things 3-->nearly every day   Feeling Down, Depressed or Hopeless 3-->nearly every day   Thoughts that You Would be Better Off Dead or of Hurting Yourself in Some Way 0-->not at all   PHQ-9: Brief Depression  Severity Measure Score 6       Fall Risk Screen:  KAILA Fall Risk Assessment has not been completed.      Objective     There were no vitals taken for this visit.    BP Readings from Last 2 Encounters:   10/26/23 110/80   03/19/21 146/93       Wt Readings from Last 2 Encounters:   10/26/23 64 kg (141 lb)   03/19/21 81.2 kg (179 lb)       BMI is within normal parameters. No other follow-up for BMI required.         Physical Exam  Derm Physical Exam  Result Review                 Assessment and Plan     Procedures  Plan   There are no diagnoses linked to this encounter.    Problem List Items Addressed This Visit    None       Follow Up   Wrapup Tab  No follow-ups on file.   There are no Patient Instructions on file for this visit.   Patient was given instructions and counseling regarding his condition or for health maintenance advice. Please see specific information pulled into the AVS if appropriate.  Hand hygiene was performed during entrance to exam room and following assessment of patient. This document is intended for medical expert use only.     EMR Dragon/Transcription disclaimer:   Much of this encounter note is an electronic transcription/translation of spoken language to printed text. The electronic translation of spoken language may permit erroneous, or at times, nonsensical words or phrases to be inadvertently transcribed.      NELLY Alan, FNP-C  [address]  [facility]  [address] [place] [address] [address]  [facility] [place] [address] [phone]

## 2024-04-04 NOTE — ASSESSMENT & PLAN NOTE
Last A1c 14.  October 2023.  Referred to endocrinology.  Have not seen patient since this time.    Registration reports unable to contact patient and sent a letter November 1.  There does not appear to be any encounter with endocrinology since that time.

## 2024-07-08 ENCOUNTER — OFFICE VISIT (OUTPATIENT)
Dept: FAMILY MEDICINE CLINIC | Facility: CLINIC | Age: 54
End: 2024-07-08
Payer: MEDICAID

## 2024-07-08 VITALS
RESPIRATION RATE: 18 BRPM | DIASTOLIC BLOOD PRESSURE: 78 MMHG | HEART RATE: 94 BPM | TEMPERATURE: 97.2 F | HEIGHT: 72 IN | OXYGEN SATURATION: 97 % | SYSTOLIC BLOOD PRESSURE: 132 MMHG | WEIGHT: 139.2 LBS | BODY MASS INDEX: 18.85 KG/M2

## 2024-07-08 DIAGNOSIS — Z09 FOLLOW-UP EXAM: Primary | ICD-10-CM

## 2024-07-08 DIAGNOSIS — I10 HYPERTENSION, UNSPECIFIED TYPE: ICD-10-CM

## 2024-07-08 DIAGNOSIS — F41.9 ANXIETY: ICD-10-CM

## 2024-07-08 DIAGNOSIS — T14.8XXA HEMATOMA: ICD-10-CM

## 2024-07-08 DIAGNOSIS — E11.65 UNCONTROLLED TYPE 2 DIABETES MELLITUS WITH HYPERGLYCEMIA: ICD-10-CM

## 2024-07-08 DIAGNOSIS — E78.2 MIXED HYPERLIPIDEMIA: ICD-10-CM

## 2024-07-08 DIAGNOSIS — G47.00 INSOMNIA, UNSPECIFIED TYPE: ICD-10-CM

## 2024-07-08 LAB
EXPIRATION DATE: ABNORMAL
HBA1C MFR BLD: 13.8 % (ref 4.5–5.7)
Lab: ABNORMAL

## 2024-07-08 PROCEDURE — 3078F DIAST BP <80 MM HG: CPT

## 2024-07-08 PROCEDURE — 99214 OFFICE O/P EST MOD 30 MIN: CPT

## 2024-07-08 PROCEDURE — 3075F SYST BP GE 130 - 139MM HG: CPT

## 2024-07-08 PROCEDURE — 3046F HEMOGLOBIN A1C LEVEL >9.0%: CPT

## 2024-07-08 PROCEDURE — 83036 HEMOGLOBIN GLYCOSYLATED A1C: CPT

## 2024-07-08 RX ORDER — DULAGLUTIDE 0.75 MG/.5ML
0.75 INJECTION, SOLUTION SUBCUTANEOUS WEEKLY
Qty: 2 ML | Refills: 2 | Status: SHIPPED | OUTPATIENT
Start: 2024-07-08

## 2024-07-08 RX ORDER — AMITRIPTYLINE HYDROCHLORIDE 10 MG/1
10 TABLET, FILM COATED ORAL NIGHTLY
Qty: 30 TABLET | Refills: 12 | Status: SHIPPED | OUTPATIENT
Start: 2024-07-08 | End: 2024-07-08

## 2024-07-08 RX ORDER — AMITRIPTYLINE HYDROCHLORIDE 50 MG/1
50 TABLET, FILM COATED ORAL NIGHTLY
Qty: 30 TABLET | Refills: 11 | Status: SHIPPED | OUTPATIENT
Start: 2024-07-08

## 2024-07-08 RX ORDER — ATORVASTATIN CALCIUM 40 MG/1
40 TABLET, FILM COATED ORAL NIGHTLY
Qty: 90 TABLET | Refills: 0 | Status: SHIPPED | OUTPATIENT
Start: 2024-07-08

## 2024-07-08 RX ORDER — LISINOPRIL 10 MG/1
10 TABLET ORAL DAILY
Qty: 90 TABLET | Refills: 3 | Status: SHIPPED | OUTPATIENT
Start: 2024-07-08

## 2024-07-08 RX ORDER — BUPROPION HYDROCHLORIDE 150 MG/1
150 TABLET ORAL DAILY
Qty: 30 TABLET | Refills: 1 | Status: SHIPPED | OUTPATIENT
Start: 2024-07-08 | End: 2024-07-08

## 2024-07-08 NOTE — PROGRESS NOTES
Office Note     Name: Winston Benites    : 1970     MRN: [id number]     Chief Complaint  Diabetes (Been off DM meds for awhile has Insurance now.) and Hospital Follow Up Visit (Right leg swelling and ankle from fall 3 weeks ago. )    Subjective     History of Present Illness:  Winston Benites is a 54 y.o. male who presents today for Hospital follow up from a fall that happened 3 weeks ago. Patient is a diabetic and foot and leg swelling scared him enough that we went to [facility] ED to have it check. He just got back on insurance and wants to get back on all medication most importantly Lexapro and Wellbutrin and get DM under control.    Diabetes  He presents for his initial diabetic visit. He has type 2 diabetes mellitus. No MedicAlert identification noted. His disease course has been worsening. Hypoglycemia symptoms include headaches and sweats. Associated symptoms include blurred vision, fatigue and weakness. Pertinent negatives for diabetes include no chest pain. Symptoms are worsening. There are no known risk factors for coronary artery disease. When asked about current treatments, none were reported. He is compliant with treatment none of the time. There is no change in his home blood glucose trend. His blood sugar drops below 70 never.      Winston was seen at [facility] emergency room on  for a reported right lower leg and foot swelling status post tripping on his dog's 25 days ago.  He states he twisted a muscle and has some pain with increased swelling from the knee down to the foot with bluish discoloration on the medial side of the ankle top of the foot.  Denied any numbness or tingling in toes.  No history of DVT no problems moving ankles and toes no foot pain.  Pain is primarily in the calf area.  DVT ultrasound completed with 2.4 x 3 complex structure in the posterior upper calf could represent hematoma or mass consider follow-up outpatient MRI for further eval if no resolution.   Blood sugar appear to be uncontrolled.  X-rays tib-fib negative for fracture.  He was recommended elevation, Ace wrap, outpatient orthopedic follow-up hemoglobin was noted to be 12, hematocrit 35.8%, glucose level 688, RBCs 3.89, sodium 127, calcium 8.7, glucose was treated and subsequent readings were 452 and 395 respectively.      Last office visit on October 26, 2023 to establish care was noted to have an uncontrolled A1C level of 14 and was referred to endocrinology.  Referral placed for management of uncontrolled diabetes.      History of Present Illness  The patient is a 53-year-old male who is here for follow-up visit from [facility]. He had a fall with a hematoma on his leg. During that office visit, he was noted to have elevated blood glucose levels. He was last seen here in 10/2023 for establishment of care. A referral was placed to endocrinology at that time, but he did not follow up. We will place a referral to endocrinology and recommend that he see endocrinology for management of uncontrolled type 2 diabetes.    The patient has been experiencing a limp for approximately 2 weeks, accompanied by pain in the calf area upon palpation. An x-ray and ultrasound were performed, revealing no blood clot. Despite being in the midst of a move, his foot exhibits significant swelling, a consequence of his ER visit. The discoloration has since returned to a flesh color, a source of concern for him.    The patient has been unable to obtain his diabetes medications due to insurance issues. He expresses a desire to resume Trulicity, which he believes was beneficial. He is also on metformin 1000 mg twice daily.    The patient is currently on amitriptyline, which aids in sleep. He reports sleeping for 20 to 40 minutes before waking for an hour.    The patient believes his mood and stress are contributing to his depression. He has been unable to perform his [employer] profession for the past 2 weeks due to his leg  condition.    The patient was previously on metoprolol for hypertension, but is not currently on any medication. He denies experiencing chest pain.  • His father  of a massive heart attack.    No Known Allergies      Current Outpatient Medications:   •  albuterol sulfate  (90 Base) MCG/ACT inhaler, Inhale 2 puffs Every 4 (Four) Hours As Needed for Wheezing., Disp: 6.7 g, Rfl: 0  •  amitriptyline (ELAVIL) 50 MG tablet, Take 1 tablet by mouth Every Night., Disp: 30 tablet, Rfl: 11  •  amLODIPine (NORVASC) 2.5 MG tablet, Take 1 tablet by mouth Daily., Disp: 30 tablet, Rfl: 1  •  atorvastatin (LIPITOR) 40 MG tablet, Take 1 tablet by mouth Every Night., Disp: 90 tablet, Rfl: 0  •  Continuous Blood Gluc  (FreeStyle Jah 2 [place]) device, Use 1 each Daily. To be used daily to monitor continuous blood sugar reading for type 2 diabetic patient on insulin regimen., Disp: 1 each, Rfl: 0  •  Continuous Blood Gluc Sensor (FreeStyle Jah 2 Sensor) misc, Use 1 each Every 14 (Fourteen) Days. Dispense 2 sensor pack. To be used daily with freestyle jah device to monitor continuous blood sugar reading for type 2 diabetic patient on insulin regimen., Disp: 2 each, Rfl: 12  •  Dulaglutide (Trulicity) 0.75 MG/0.5ML solution pen-injector, Inject 0.75 mg under the skin into the appropriate area as directed 1 (One) Time Per Week., Disp: 2 mL, Rfl: 2  •  empagliflozin (Jardiance) 10 MG tablet tablet, Take 1 tablet by mouth Daily., Disp: 30 tablet, Rfl: 12  •  metFORMIN (GLUCOPHAGE) 1000 MG tablet, Take 1 tablet by mouth 2 (Two) Times a Day With Meals., Disp: 180 tablet, Rfl: 0  •  nystatin (MYCOSTATIN) 100,000 unit/mL suspension, Swish and swallow 5 mL 4 (Four) Times a Day., Disp: 60 mL, Rfl: 0  •  lisinopril (PRINIVIL,ZESTRIL) 10 MG tablet, Take 1 tablet by mouth Daily., Disp: 90 tablet, Rfl: 3    Review of Systems   Constitutional:  Positive for fatigue.   Eyes:  Positive for blurred vision.   Respiratory:  Negative  "for cough, shortness of breath and wheezing.    Cardiovascular:  Positive for leg swelling. Negative for chest pain and palpitations.   Neurological:  Positive for weakness.   All other systems reviewed and are negative.      Social History     Socioeconomic History   • Marital status: Single   Tobacco Use   • Smoking status: Every Day     Current packs/day: 0.50     Average packs/day: 0.5 packs/day for 34.5 years (17.3 ttl pk-yrs)     Types: Cigarettes     Start date: 1990   • Smokeless tobacco: Never   • Tobacco comments:     Patient is not inclined but advised to quit smoking   Vaping Use   • Vaping status: Never Used   Substance and Sexual Activity   • Alcohol use: Yes   • Drug use: No   • Sexual activity: Defer       Family History   Problem Relation Age of Onset   • Lung disease Mother    • Heart disease Father    • Breast cancer Sister            7/8/2024    10:46 AM   PHQ-2/PHQ-9 Depression Screening   Little Interest or Pleasure in Doing Things 0-->not at all   Feeling Down, Depressed or Hopeless 0-->not at all   PHQ-9: Brief Depression Severity Measure Score 0       Fall Risk Screen:  KAILA Fall Risk Assessment has not been completed.      Objective     /78 (BP Location: Right arm, Patient Position: Sitting, Cuff Size: Large Adult)   Pulse 94   Temp 97.2 °F (36.2 °C) (Temporal)   Resp 18   Ht 182.9 cm (72.01\")   Wt 63.1 kg (139 lb 3.2 oz)   SpO2 97%   BMI 18.87 kg/m²     BP Readings from Last 2 Encounters:   07/08/24 132/78   10/26/23 110/80       Wt Readings from Last 2 Encounters:   07/08/24 63.1 kg (139 lb 3.2 oz)   04/04/24 65.2 kg (143 lb 12.8 oz)       BMI is within normal parameters. No other follow-up for BMI required.         Physical Exam  Vitals and nursing note reviewed.   Constitutional:       General: He is not in acute distress.     Appearance: Normal appearance. He is well-groomed and normal weight. He is not ill-appearing.   HENT:      Head: Normocephalic and atraumatic. "   Eyes:      General: Lids are normal. Vision grossly intact.   Neck:      Vascular: No carotid bruit.   Cardiovascular:      Rate and Rhythm: Normal rate and regular rhythm.      Heart sounds: Normal heart sounds.   Pulmonary:      Effort: Pulmonary effort is normal.      Breath sounds: Normal breath sounds and air entry.   Musculoskeletal:      Right lower leg: No edema.      Left lower leg: No edema.        Legs:       Comments: Hematoma noted.      Skin:     General: Skin is warm and dry.   Neurological:      Mental Status: He is alert and oriented to person, place, and time. Mental status is at baseline.   Psychiatric:         Mood and Affect: Mood normal.         Behavior: Behavior normal. Behavior is cooperative.       Derm Physical Exam  Physical Exam  Vital Signs  Vitals show a blood pressure of 132/78.    Result Review :       Results  Laboratory Studies  Elevated blood glucose levels.    Imaging  Venous Doppler showed no blood clot.    Assessment and Plan        Plan    Diagnoses and all orders for this visit:    1. Follow-up exam (Primary)    2. Hematoma  -     Ambulatory Referral to Orthopedic Surgery    3. Uncontrolled type 2 diabetes mellitus with hyperglycemia  -     POC Glycosylated Hemoglobin (Hb A1C)  -     Ambulatory Referral to Endocrinology  -     metFORMIN (GLUCOPHAGE) 1000 MG tablet; Take 1 tablet by mouth 2 (Two) Times a Day With Meals.  Dispense: 180 tablet; Refill: 0  -     Dulaglutide (Trulicity) 0.75 MG/0.5ML solution pen-injector; Inject 0.75 mg under the skin into the appropriate area as directed 1 (One) Time Per Week.  Dispense: 2 mL; Refill: 2  -     empagliflozin (Jardiance) 10 MG tablet tablet; Take 1 tablet by mouth Daily.  Dispense: 30 tablet; Refill: 12    4. Mixed hyperlipidemia  -     atorvastatin (LIPITOR) 40 MG tablet; Take 1 tablet by mouth Every Night.  Dispense: 90 tablet; Refill: 0    5. Hypertension, unspecified type  -     lisinopril (PRINIVIL,ZESTRIL) 10 MG tablet;  Take 1 tablet by mouth Daily.  Dispense: 90 tablet; Refill: 3    6. Anxiety  -     amitriptyline (ELAVIL) 50 MG tablet; Take 1 tablet by mouth Every Night.  Dispense: 30 tablet; Refill: 11    7. Insomnia, unspecified type  -     amitriptyline (ELAVIL) 50 MG tablet; Take 1 tablet by mouth Every Night.  Dispense: 30 tablet; Refill: 11    Other orders  -     Discontinue: buPROPion XL (WELLBUTRIN XL) 150 MG 24 hr tablet; Take 1 tablet by mouth Daily.  Dispense: 30 tablet; Refill: 1  -     Discontinue: amitriptyline (ELAVIL) 10 MG tablet; Take 1 tablet by mouth Every Night.  Dispense: 30 tablet; Refill: 12       Assessment & Plan  1. Leg hematoma.  The patient was informed that the hematoma should resolve over time. The patient was advised to rest and elevate his leg.    2. Uncontrolled type 2 diabetes.  A referral to endocrinology will be initiated. The patient will maintain his current regimen of Trulicity 0.75 mg weekly injection, Jardiance 10 mg daily, and metformin 1000 mg twice daily with meals.    3. Anxiety and depression.  The patient will commence Elavil 10 mg nightly.    4. Hypertension.  The patient's blood pressure is well-controlled. Lisinopril 10 mg will be prescribed. Metoprolol 25 mg twice daily will be discontinued.    5. Hyperlipidemia.  Atorvastatin 40 mg will be prescribed.    Follow-up  A follow-up appointment is scheduled for 4 weeks from now.       Follow Up   Wrapup Tab  Return in about 3 months (around 10/8/2024) for Follow up exam and physical exam. .     Patient was given instructions and counseling regarding his condition or for health maintenance advice. Please see specific information pulled into the AVS if appropriate.  Hand hygiene was performed during entrance to exam room and following assessment of patient. This document is intended for medical expert use only.       NELLY Alan, FNP-C  [address]  [facility]  [address] [place] [address] [address]  [place] [id number] [phone]    Patient or patient representative verbalized consent for the use of Ambient Listening during the visit with  NELLY Alan for chart documentation. 7/8/2024  11:07 EDT

## 2025-01-23 PROBLEM — F41.9 ANXIETY: Status: ACTIVE | Noted: 2025-01-23

## 2025-01-23 PROBLEM — S72.001A CLOSED FRACTURE OF NECK OF RIGHT FEMUR: Status: ACTIVE | Noted: 2025-01-23

## 2025-01-23 PROBLEM — R91.1 PULMONARY NODULE, RIGHT: Status: ACTIVE | Noted: 2025-01-23

## 2025-01-23 PROBLEM — I71.21 THORACIC ASCENDING AORTIC ANEURYSM: Status: ACTIVE | Noted: 2025-01-23

## 2025-01-27 ENCOUNTER — OFFICE VISIT (OUTPATIENT)
Dept: FAMILY MEDICINE CLINIC | Facility: CLINIC | Age: 55
End: 2025-01-27
Payer: MEDICAID

## 2025-01-27 VITALS
TEMPERATURE: 97.4 F | WEIGHT: 133.6 LBS | OXYGEN SATURATION: 95 % | DIASTOLIC BLOOD PRESSURE: 60 MMHG | SYSTOLIC BLOOD PRESSURE: 107 MMHG | HEIGHT: 72 IN | BODY MASS INDEX: 18.1 KG/M2 | RESPIRATION RATE: 18 BRPM | HEART RATE: 77 BPM

## 2025-01-27 DIAGNOSIS — Z72.0 TOBACCO USE: ICD-10-CM

## 2025-01-27 DIAGNOSIS — I71.21 ANEURYSM OF ASCENDING AORTA WITHOUT RUPTURE: ICD-10-CM

## 2025-01-27 DIAGNOSIS — D69.6 THROMBOCYTOPENIA: ICD-10-CM

## 2025-01-27 DIAGNOSIS — R91.1 PULMONARY NODULE, RIGHT: ICD-10-CM

## 2025-01-27 DIAGNOSIS — E78.2 MIXED HYPERLIPIDEMIA: ICD-10-CM

## 2025-01-27 DIAGNOSIS — E11.65 TYPE 2 DIABETES MELLITUS WITH HYPERGLYCEMIA, WITHOUT LONG-TERM CURRENT USE OF INSULIN: ICD-10-CM

## 2025-01-27 DIAGNOSIS — I10 HYPERTENSION, UNSPECIFIED TYPE: ICD-10-CM

## 2025-01-27 DIAGNOSIS — F41.9 ANXIETY: ICD-10-CM

## 2025-01-27 DIAGNOSIS — G47.00 INSOMNIA, UNSPECIFIED TYPE: ICD-10-CM

## 2025-01-27 DIAGNOSIS — S72.001S CLOSED FRACTURE OF NECK OF RIGHT FEMUR, SEQUELA: Primary | ICD-10-CM

## 2025-01-27 LAB
BILIRUB BLD-MCNC: NEGATIVE MG/DL
CLARITY, POC: CLEAR
COLOR UR: YELLOW
EXPIRATION DATE: ABNORMAL
GLUCOSE UR STRIP-MCNC: ABNORMAL MG/DL
HBA1C MFR BLD: 13.4 % (ref 4.5–5.7)
KETONES UR QL: ABNORMAL
LEUKOCYTE EST, POC: NEGATIVE
Lab: ABNORMAL
NITRITE UR-MCNC: NEGATIVE MG/ML
PH UR: 5.5 [PH] (ref 5–8)
PROT UR STRIP-MCNC: NEGATIVE MG/DL
RBC # UR STRIP: NEGATIVE /UL
SP GR UR: 1.02 (ref 1–1.03)
UROBILINOGEN UR QL: NORMAL

## 2025-01-27 PROCEDURE — 3046F HEMOGLOBIN A1C LEVEL >9.0%: CPT

## 2025-01-27 PROCEDURE — 99214 OFFICE O/P EST MOD 30 MIN: CPT

## 2025-01-27 PROCEDURE — 3074F SYST BP LT 130 MM HG: CPT

## 2025-01-27 PROCEDURE — 83036 HEMOGLOBIN GLYCOSYLATED A1C: CPT

## 2025-01-27 PROCEDURE — 3078F DIAST BP <80 MM HG: CPT

## 2025-01-27 RX ORDER — ASPIRIN 325 MG
325 TABLET, DELAYED RELEASE (ENTERIC COATED) ORAL EVERY 6 HOURS PRN
Qty: 21 TABLET | Refills: 0 | Status: SHIPPED | OUTPATIENT
Start: 2025-01-27

## 2025-01-27 RX ORDER — ATORVASTATIN CALCIUM 40 MG/1
40 TABLET, FILM COATED ORAL NIGHTLY
Qty: 90 TABLET | Refills: 3 | Status: SHIPPED | OUTPATIENT
Start: 2025-01-27

## 2025-01-27 RX ORDER — LISINOPRIL 5 MG/1
5 TABLET ORAL DAILY
Qty: 90 TABLET | Refills: 3 | Status: SHIPPED | OUTPATIENT
Start: 2025-01-27

## 2025-01-27 RX ORDER — AMITRIPTYLINE HYDROCHLORIDE 50 MG/1
50 TABLET ORAL NIGHTLY
Qty: 90 TABLET | Refills: 3 | Status: SHIPPED | OUTPATIENT
Start: 2025-01-27

## 2025-01-27 RX ORDER — AMLODIPINE BESYLATE 2.5 MG/1
2.5 TABLET ORAL DAILY
Qty: 30 TABLET | Refills: 1 | Status: CANCELLED | OUTPATIENT
Start: 2025-01-27

## 2025-01-27 RX ORDER — INSULIN GLARGINE 100 [IU]/ML
10 INJECTION, SOLUTION SUBCUTANEOUS NIGHTLY
Qty: 9 ML | Refills: 0 | Status: SHIPPED | OUTPATIENT
Start: 2025-01-27

## 2025-01-27 RX ORDER — ALBUTEROL SULFATE 90 UG/1
2 INHALANT RESPIRATORY (INHALATION) EVERY 4 HOURS PRN
Qty: 6.7 G | Refills: 0 | Status: CANCELLED | OUTPATIENT
Start: 2025-01-27

## 2025-01-27 NOTE — ASSESSMENT & PLAN NOTE
Lipid levels will be checked.  Continue diet, lifestyle and medication.       Orders:    Lipid Panel With LDL / HDL Ratio    atorvastatin (LIPITOR) 40 MG tablet; Take 1 tablet by mouth Every Night.

## 2025-01-27 NOTE — ASSESSMENT & PLAN NOTE
CT chest completed at [facility] January 2025 with repeat chest CT due in 1 year for a 2.9 mm right upper lobe nodule  Orders:    CT Chest Without Contrast; Future

## 2025-01-27 NOTE — PROGRESS NOTES
Subjective   Winston Benites is a 54 y.o. male. Presents to [facility]    Winston was seen at [facility]. He was admitted on 01/19/2025  for Broken right Hip. He was discharged on 01/21/2025. Discharge diagnosis was Broken Hip. Labs that were performed during the encounter included: CBC, CMP. Diagnostic studies that were performed included: X-Ray-right hip and CT Scan-cT thorax with chest . Currently Winston receives care at home. Complications from the hospital stay include none. The patient stated that they do need help with their daily life and activities. The patient stated that they do not have emotional support at home.  Current outpatient and discharge medications have been reconciled for the patient.    Needs medication refills, due to insurance has not been on medication for anxiety, diabetes. Wants Home health and PT order  Reviewed by: NELLY Alan      *Note: 88263 is for high complexity patients with a face to face visit within 7 days of discharge.  03793 is for high complexity patients with a face to face on days 8-14 post discharge or medium complexity with face to face visit within 14 days post discharge.      Chief Complaint   Patient presents with    Hospital Follow Up Visit     [facility] Broken Hip       History of Present Illness   History of Present Illness  The patient is a 54-year-old male who presents for evaluation of a right hip fracture. He also has diabetes, emphysema, insomnia, and an aneurysm.    He sustained a right hip fracture due to a fall on ice. The bandage has been removed, and he is scheduled for staple removal on Thursday. He anticipates an 8-week recovery period before regaining normal function. He has an upcoming appointment with his surgeon this week and a physical therapy session scheduled for tomorrow. He does not have a preference for home health or physical therapy services.    He has been informed of a pulmonary nodule detected on a  recent CT scan, which requires further evaluation in a year. He has also been diagnosed with emphysema but does not exhibit the typical cough associated with this condition. He reports no severe symptoms and acknowledges the need to cease smoking completely. He has reduced his smoking habit significantly over the past few months and plans to use patches to aid in further reduction. He is not currently using an albuterol rescue inhaler.    He has been diagnosed with an aneurysm. He reports no family history of aneurysms. His blood pressure readings have been consistently low. He is uncertain about his current medication regimen but believes he is taking lisinopril daily for blood pressure management. He does not think he is taking amlodipine 2.5 mg for blood pressure.    He has been experiencing insomnia for several years, which has worsened recently. He typically sleeps less than 2 hours per night, although he managed to sleep for 3 consecutive hours a few nights ago. He attributes his sleep disturbances to muscle aches. He has attempted to manage his insomnia with melatonin and Relaxium, but these interventions have not been effective.    He is uncertain about his current medication regimen but believes he is taking lisinopril daily for blood pressure management. He is unsure about his other medications but recalls being prescribed metformin for diabetes. He has run out of some of his medications. He has been monitoring his blood glucose levels at home, which have consistently been above 300. The lowest recorded level was 175 during his hospital stay. He is not currently on Trulicity injections due to insurance coverage issues. He has lost approximately 50 pounds over the past year. He reports no numbness in his right leg but experiences occasional sharp pains in his feet, which he attributes to diabetic neuropathy. He reports no presence of blood in his stool or urine. He experienced night sweats last night but  does not report any coughing.    SOCIAL HISTORY  The patient is trying to quit smoking and has cut down significantly over the last few months.    FAMILY HISTORY  The patient reports no family history of aneurysms that he is aware of.    MEDICATIONS  Current: metformin, atorvastatin, lisinopril, amitriptyline, aspirin (for 3 weeks), Jardiance, Lantus, FreeStyle Jah sensor  Past: albuterol rescue inhaler, Trulicity      I personally reviewed and updated the patient's allergies, medications, problem list, and past medical, surgical, social, and family history. I have reviewed and confirmed the accuracy of the History of Present Illness and Review of Symptoms as documented by the MA/CHAYAN/RN. Klaudia Pham, NELLY    Allergies:  No Known Allergies    Social History:  Social History     Socioeconomic History    Marital status: Single   Tobacco Use    Smoking status: Every Day     Current packs/day: 0.50     Average packs/day: 0.5 packs/day for 35.1 years (17.5 ttl pk-yrs)     Types: Cigarettes     Start date: 1990    Smokeless tobacco: Never    Tobacco comments:     Patient is not inclined but advised to quit smoking   Vaping Use    Vaping status: Never Used   Substance and Sexual Activity    Alcohol use: Yes    Drug use: No    Sexual activity: Defer       Family History:  Family History   Problem Relation Age of Onset    Lung disease Mother     Heart disease Father     Breast cancer Sister        Past Medical History :  Patient Active Problem List   Diagnosis    Hypertension    Type 2 diabetes mellitus without complication, without long-term current use of insulin    Mixed hyperlipidemia    Eczema    Congenital hiatus hernia    Tobacco use    Chest pain    Abnormal EKG    Tachycardia    Encounter to establish care    Head trauma    Heart murmur    Hernia, umbilical    Kidney stones    Anxiety    Pulmonary nodule, right    Thoracic ascending aortic aneurysm    Closed fracture of neck of right femur       Medication  List:    Current Outpatient Medications:     albuterol sulfate  (90 Base) MCG/ACT inhaler, Inhale 2 puffs Every 4 (Four) Hours As Needed for Wheezing., Disp: 6.7 g, Rfl: 0    amitriptyline (ELAVIL) 50 MG tablet, Take 1 tablet by mouth Every Night., Disp: 90 tablet, Rfl: 3    atorvastatin (LIPITOR) 40 MG tablet, Take 1 tablet by mouth Every Night., Disp: 90 tablet, Rfl: 3    Continuous Glucose  (FreeStyle Jah 2 [place]) device, Use 1 each Daily. To be used daily to monitor continuous blood sugar reading for type 2 diabetic patient on insulin regimen., Disp: 1 each, Rfl: 0    Continuous Glucose Sensor (FreeStyle Jah 2 Sensor) misc, Use 1 each Every 14 (Fourteen) Days. Dispense 2 sensor pack. To be used daily with freestyle jah device to monitor continuous blood sugar reading for type 2 diabetic patient on insulin regimen., Disp: 6 each, Rfl: 3    empagliflozin (Jardiance) 10 MG tablet tablet, Take 1 tablet by mouth Daily., Disp: 90 tablet, Rfl: 3    lisinopril (PRINIVIL,ZESTRIL) 5 MG tablet, Take 1 tablet by mouth Daily., Disp: 90 tablet, Rfl: 3    metFORMIN (GLUCOPHAGE) 1000 MG tablet, Take 1 tablet by mouth 2 (Two) Times a Day With Meals., Disp: 180 tablet, Rfl: 3    nystatin (MYCOSTATIN) 100,000 unit/mL suspension, Swish and swallow 5 mL 4 (Four) Times a Day., Disp: 60 mL, Rfl: 0    aspirin 325 MG EC tablet, Take 1 tablet by mouth Every 6 (Six) Hours As Needed (s/p hip surgery dvt prophylaxis)., Disp: 21 tablet, Rfl: 0    Insulin Glargine (Lantus SoloStar) 100 UNIT/ML injection pen, Inject 10 Units under the skin into the appropriate area as directed Every Night., Disp: 9 mL, Rfl: 0    Insulin Pen Needle 32G X 4 MM misc, Use QD, Disp: 100 each, Rfl: 3    Past Surgical History:  Past Surgical History:   Procedure Laterality Date    APPENDECTOMY      HERNIA REPAIR         Review of Systems:  Review of Systems   Constitutional:  Negative for chills, fatigue and fever.   Gastrointestinal:  Negative  "for blood in stool.   Genitourinary:  Negative for hematuria.   Musculoskeletal:  Positive for arthralgias, gait problem (walker) and myalgias.       Physical Exam:  Vital Signs:  Vital Signs:   /60 (BP Location: Right arm, Patient Position: Sitting, Cuff Size: Large Adult)   Pulse 77   Temp 97.4 °F (36.3 °C) (Temporal)   Resp 18   Ht 182.9 cm (72.01\")   Wt 60.6 kg (133 lb 9.6 oz)   SpO2 95%   BMI 18.12 kg/m²     Result Review :   The following data was reviewed by: NELLY Alan on 01/27/2025:  Common labs          7/8/2024    11:46 1/27/2025    10:21   Common Labs   Hemoglobin A1C 13.8  13.4      Data reviewed : Radiologic studies CT Chest and Recent hospitalization notes [facility]        Results  Laboratory Studies  A1c is 13.4. Platelet count was slightly low. Mild anemia noted.    Imaging  CT of chest showed a pulmonary nodule and fusiform dilatation of the ascending thoracic aorta measuring 3.8 x 3.8 cm. No pathologic appearing mediastinal or hilar lymphadenopathy.      Physical Exam  Vitals and nursing note reviewed.   Constitutional:       General: He is not in acute distress.     Appearance: Normal appearance. He is well-groomed. He is not ill-appearing.   HENT:      Head: Normocephalic and atraumatic.   Eyes:      General: Lids are normal. Vision grossly intact.   Neck:      Vascular: No carotid bruit.   Cardiovascular:      Rate and Rhythm: Normal rate and regular rhythm.      Heart sounds: Normal heart sounds.   Pulmonary:      Effort: Pulmonary effort is normal.      Breath sounds: Normal breath sounds and air entry.   Musculoskeletal:      Right hip: Decreased range of motion.      Left hip: Normal.      Right lower leg: No edema.      Left lower leg: No edema.      Comments: Patient deferred request for examination of surgical incision to right hip/femur.     Skin:     General: Skin is warm and dry.   Neurological:      Mental Status: He is alert and oriented to " person, place, and time. Mental status is at baseline.   Psychiatric:         Mood and Affect: Mood normal.         Behavior: Behavior normal. Behavior is cooperative.         Thought Content: Thought content normal.       Physical Exam      Assessment and Plan:  Assessment & Plan  Closed fracture of neck of right femur, sequela  Staple removal 10 to 12 days postop.  Aspirin 325 daily for 3 weeks for DVT prophylaxis.  Remove dressing and shower postop day 5.  Orders:    Ambulatory Referral to Home Health    aspirin 325 MG EC tablet; Take 1 tablet by mouth Every 6 (Six) Hours As Needed (s/p hip surgery dvt prophylaxis).    Type 2 diabetes mellitus with hyperglycemia, without long-term current use of insulin    A1C 13.4, previously 13.8.     Orders:    POC Glycosylated Hemoglobin (Hb A1C)    empagliflozin (Jardiance) 10 MG tablet tablet; Take 1 tablet by mouth Daily.    metFORMIN (GLUCOPHAGE) 1000 MG tablet; Take 1 tablet by mouth 2 (Two) Times a Day With Meals.    Insulin Glargine (Lantus SoloStar) 100 UNIT/ML injection pen; Inject 10 Units under the skin into the appropriate area as directed Every Night.    Insulin Pen Needle 32G X 4 MM misc; Use QD    Ambulatory Referral to Endocrinology    Continuous Glucose  (FreeStyle Jah 2 [place]) device; Use 1 each Daily. To be used daily to monitor continuous blood sugar reading for type 2 diabetic patient on insulin regimen.    Continuous Glucose Sensor (FreeStyle Jah 2 Sensor) misc; Use 1 each Every 14 (Fourteen) Days. Dispense 2 sensor pack. To be used daily with freestyle jah device to monitor continuous blood sugar reading for type 2 diabetic patient on insulin regimen.    Pulmonary nodule, right  CT chest completed at [facility] January 2025 with repeat chest CT due in 1 year for a 2.9 mm right upper lobe nodule  Orders:    CT Chest Without Contrast; Future    Aneurysm of ascending aorta without rupture  Recheck 3.8x 3.8 cm  fusiform.    Orders:    CT Chest Without Contrast; Future    Hypertension, unspecified type  Hypertension controlled with diet, lifestyle and medication.    Will check labs and refill medication as needed.       Orders:    CBC & Differential    Comprehensive Metabolic Panel    POC Urinalysis Dipstick, Multipro    lisinopril (PRINIVIL,ZESTRIL) 5 MG tablet; Take 1 tablet by mouth Daily.    Thrombocytopenia  Recheck platelet.       Tobacco use  Using nicotine patches given at [facility]. Will .        Mixed hyperlipidemia   Lipid levels will be checked.  Continue diet, lifestyle and medication.       Orders:    Lipid Panel With LDL / HDL Ratio    atorvastatin (LIPITOR) 40 MG tablet; Take 1 tablet by mouth Every Night.    Anxiety    Orders:    amitriptyline (ELAVIL) 50 MG tablet; Take 1 tablet by mouth Every Night.    Insomnia, unspecified type    Orders:    amitriptyline (ELAVIL) 50 MG tablet; Take 1 tablet by mouth Every Night.      Assessment & Plan  1. Right hip fracture.  He will continue to follow up with his orthopedic surgeon. A referral for home health services has been initiated.    2. Diabetes Mellitus.  His A1c is significantly elevated at 13.4, indicating poor glycemic control. It was 13.8, 6 months ago. He is currently on metformin 1000 mg twice a day with meals. He will be started on 10 units of Lantus insulin at night. A referral to endocrinology has been made. He is advised to monitor his blood glucose levels fasting every morning and then before breakfast, lunch, dinner, and at night. A prescription for Jardiance has been provided. He is advised to take a vitamin B12 supplement to help with neuropathy symptoms. A FreeStyle Jah sensor has been ordered; if insurance does not cover it, a switch to Dexcom will be considered. Blood work, including cholesterol, CBC, CMP, and urine analysis, has been ordered.    3. Pulmonary nodule.  A pulmonary nodule was identified on a recent CT scan. A follow-up CT scan  has been scheduled for one year from now to monitor the nodule.    4. Aortic aneurysm.  A fusiform dilatation of the ascending thoracic aorta measuring 3.8 x 3.8 cm was noted. This will be monitored with routine ultrasounds. He is advised to control his blood pressure to prevent further enlargement.    5. Anemia.  Mild anemia was noted. A repeat CBC will be conducted to monitor his condition.    6. Thrombocytopenia.  A slightly low platelet count was observed. A repeat CBC will be conducted to monitor his condition.    7. Emphysema.  He has been diagnosed with emphysema but does not exhibit the typical cough associated with this condition. He reports no severe symptoms and acknowledges the need to cease smoking completely. He is advised to quit smoking completely. He has been using patches to aid in smoking cessation and will continue to do so.    8. Insomnia.  He reports difficulty sleeping, averaging less than 2 hours of sleep per night. He has tried melatonin without success. Further evaluation and management will be considered if symptoms persist.    9. Medication Management.  He is currently taking atorvastatin 40 mg at night for cholesterol and lisinopril 10 mg daily for blood pressure. The lisinopril dosage will be reduced to 5 mg daily due to well-controlled blood pressure. He is also on amitriptyline for nighttime use and aspirin 325 mg for 3 weeks to prevent blood clots.    Follow-up  The patient will follow up in 2 weeks with a copy of his blood glucose readings.    PROCEDURE  The patient had staples placed in the right hip following a fracture.    Risk for Readmission (LACE) No data recorded    An After Visit Summary and PPPS were given to the patient.           Follow Up   Return in about 2 weeks (around 2/10/2025) for Recheck with readings of blood glucose.  .  Patient was given instructions and counseling regarding his condition or for health maintenance advice. Please see specific information pulled  into the AVS if appropriate.    There are no Patient Instructions on file for this visit.     This document is intended for medical expert use only. Reading of this document by patients and/or patient's family without participating medical staff guidance may result in misinterpretation and unintended morbidity. Any interpretation of such data is the responsibility of the patient and/or family member responsible for the patient in concert with their primary or specialist providers, not to be left for sources of online searches such as i-drive, [employer] or similar queries. Relying on these approaches to knowledge may result in misinterpretation, misguided goals of care and even death should patients or family members try recommendations outside of the realm of professional medical care.      Patient or patient representative verbalized consent for the use of Ambient Listening during the visit with  NELLY Alan for chart documentation. 1/27/2025  09:44 EST

## 2025-01-27 NOTE — ASSESSMENT & PLAN NOTE
Hypertension controlled with diet, lifestyle and medication.    Will check labs and refill medication as needed.       Orders:    CBC & Differential    Comprehensive Metabolic Panel    POC Urinalysis Dipstick, Multipro    lisinopril (PRINIVIL,ZESTRIL) 5 MG tablet; Take 1 tablet by mouth Daily.

## 2025-01-27 NOTE — ASSESSMENT & PLAN NOTE
A1C 13.4, previously 13.8.     Orders:    POC Glycosylated Hemoglobin (Hb A1C)    empagliflozin (Jardiance) 10 MG tablet tablet; Take 1 tablet by mouth Daily.    metFORMIN (GLUCOPHAGE) 1000 MG tablet; Take 1 tablet by mouth 2 (Two) Times a Day With Meals.    Insulin Glargine (Lantus SoloStar) 100 UNIT/ML injection pen; Inject 10 Units under the skin into the appropriate area as directed Every Night.    Insulin Pen Needle 32G X 4 MM misc; Use QD    Ambulatory Referral to Endocrinology    Continuous Glucose  (FreeStyle Jah 2 [place]) device; Use 1 each Daily. To be used daily to monitor continuous blood sugar reading for type 2 diabetic patient on insulin regimen.    Continuous Glucose Sensor (FreeStyle Jah 2 Sensor) misc; Use 1 each Every 14 (Fourteen) Days. Dispense 2 sensor pack. To be used daily with freestyle jah device to monitor continuous blood sugar reading for type 2 diabetic patient on insulin regimen.

## 2025-01-27 NOTE — ASSESSMENT & PLAN NOTE
Staple removal 10 to 12 days postop.  Aspirin 325 daily for 3 weeks for DVT prophylaxis.  Remove dressing and shower postop day 5.  Orders:    Ambulatory Referral to Home Health    aspirin 325 MG EC tablet; Take 1 tablet by mouth Every 6 (Six) Hours As Needed (s/p hip surgery dvt prophylaxis).

## 2025-01-28 ENCOUNTER — TELEPHONE (OUTPATIENT)
Dept: FAMILY MEDICINE CLINIC | Facility: CLINIC | Age: 55
End: 2025-01-28
Payer: MEDICAID

## 2025-01-28 ENCOUNTER — TELEPHONE (OUTPATIENT)
Dept: FAMILY MEDICINE CLINIC | Facility: CLINIC | Age: 55
End: 2025-01-28

## 2025-01-28 NOTE — TELEPHONE ENCOUNTER
Caller: Winston Benites    Relationship: Self    Best call back number: [phone]*    What was the call regarding: PATIENT CALLING WANTING TO CANCEL THE REFERRAL TO HOME HEALTH. THE PATIENT STATES HE WILL BE GOING TO THE OUTPATIENT REHAB FACILITY WITH [facility].

## 2025-01-28 NOTE — TELEPHONE ENCOUNTER
Caller: Winston Benites    Relationship: Self    Best call back number: [phone]*    What medication are you requesting: TO HELP SLEEP    What are your current symptoms: NOT SLEEPING WELL    How long have you been experiencing symptoms: FOR YEARS, BUT SINCE HIP FRACTURE THE PATIENT'S SLEEPING ISSUES HAVE GOTTEN WORSE. THE PATIENT STATES THAT HE IS ONLY GETTING AN HOUR OF SLEEP AT NIGHT.    Have you had these symptoms before:    [] Yes  [x] No    Have you been treated for these symptoms before:   [] Yes  [x] No    If a prescription is needed, what is your preferred pharmacy and phone number: [facility]   [place], [place] - [address]   - [phone] [facility] [phone] FX     Additional notes: PATIENT CALLING REQUESTING MEDICATION TO HELP HIM SLEEP.

## 2025-01-31 DIAGNOSIS — F41.9 ANXIETY: ICD-10-CM

## 2025-01-31 DIAGNOSIS — G47.00 INSOMNIA, UNSPECIFIED TYPE: ICD-10-CM

## 2025-01-31 RX ORDER — HYDROCODONE BITARTRATE AND ACETAMINOPHEN 5; 325 MG/1; MG/1
1 TABLET ORAL
COMMUNITY
Start: 2025-01-21

## 2025-02-04 RX ORDER — ACYCLOVIR 400 MG/1
1 TABLET ORAL
Qty: 9 EACH | Refills: 3 | Status: SHIPPED | OUTPATIENT
Start: 2025-02-04

## 2025-02-04 RX ORDER — ACYCLOVIR 400 MG/1
1 TABLET ORAL DAILY
Qty: 1 EACH | Refills: 0 | Status: SHIPPED | OUTPATIENT
Start: 2025-02-04

## 2025-02-10 NOTE — PROGRESS NOTES
Office Note     Name: Winston Benites    : 1970     MRN: [id number]     Chief Complaint  Blood Sugar Problem    Subjective     History of Present Illness:  Winston Benites is a 54 y.o. male who presents today for 2 week follow up of Blood sugar. We helped get Dexcom 7 set up.   Diabetes  He presents for his follow-up diabetic visit. He has type 2 diabetes mellitus. Pertinent negatives for diabetes include no fatigue.     History of Present Illness  The patient is a 54-year-old male who presents for a follow-up on blood sugar management and right femur fracture.    At the last office visit, he was given a Dexcom G7 device prescription but was unable to set it up. Our medical assistant successfully configured the device and the corresponding heath on his phone. He has not been monitoring his blood sugar at home but reports feeling well and adhering to his treatment regimen. He administers insulin injections nightly and reports no disturbances at night due to hunger, thirst, hypoglycemia, or tremors. His morning condition remains stable.    He reports significant improvement in his hip condition.  He has not experienced any falls and has been informed that his gait should normalize within a few weeks. He reports he takes tramadol just a few times. He attributes his cautious gait to residual pain from the initial injury. During his physical therapy session yesterday, he was able to ascend stairs and ambulate without a limp. He believes his cautious approach to using his leg is due to the severe pain he experienced during the first week post-injury. He has driven twice and anticipates being cleared to drive by his surgeon next week. He was advised to refrain from driving for 4 weeks post-surgery and to avoid work for 8 to 10 weeks.    SOCIAL HISTORY  He works as an [employer] .    MEDICATIONS  Current: insulin, tramadol    No Known Allergies      Current Outpatient Medications:     albuterol sulfate  (90 Base)  MCG/ACT inhaler, Inhale 2 puffs Every 4 (Four) Hours As Needed for Wheezing., Disp: 6.7 g, Rfl: 0    amitriptyline (ELAVIL) 50 MG tablet, Take 1 tablet by mouth Every Night., Disp: 90 tablet, Rfl: 3    aspirin 325 MG EC tablet, Take 1 tablet by mouth Every 6 (Six) Hours As Needed (s/p hip surgery dvt prophylaxis)., Disp: 21 tablet, Rfl: 0    atorvastatin (LIPITOR) 40 MG tablet, Take 1 tablet by mouth Every Night., Disp: 90 tablet, Rfl: 3    Continuous Glucose  (Dexcom G7 ) device, Use 1 Device Every 10 (Ten) Days. One  device to continuously monitor blood sugar for insulin dependent diabetic patient, Disp: 9 each, Rfl: 3    Continuous Glucose Sensor (Dexcom G7 Sensor) misc, Use 1 package Every 10 (Ten) Days. Use with dexcom  to continuously monitor blood sugar for insulin dependent diabetic patient, Disp: 9 each, Rfl: 3    empagliflozin (Jardiance) 10 MG tablet tablet, Take 1 tablet by mouth Daily., Disp: 90 tablet, Rfl: 3    HYDROcodone-acetaminophen (NORCO) 5-325 MG per tablet, Take 1 tablet by mouth., Disp: , Rfl:     Insulin Glargine (Lantus SoloStar) 100 UNIT/ML injection pen, Inject 10 Units under the skin into the appropriate area as directed Every Night., Disp: 9 mL, Rfl: 0    Insulin Pen Needle 32G X 4 MM misc, Use QD, Disp: 100 each, Rfl: 3    lisinopril (PRINIVIL,ZESTRIL) 5 MG tablet, Take 1 tablet by mouth Daily., Disp: 90 tablet, Rfl: 3    metFORMIN (GLUCOPHAGE) 1000 MG tablet, Take 1 tablet by mouth 2 (Two) Times a Day With Meals., Disp: 180 tablet, Rfl: 3    nicotine (NICODERM CQ) 7 MG/24HR patch, Place  on the skin as directed by provider., Disp: , Rfl:     nystatin (MYCOSTATIN) 100,000 unit/mL suspension, Swish and swallow 5 mL 4 (Four) Times a Day., Disp: 60 mL, Rfl: 0    traMADol (ULTRAM) 50 MG tablet, Take 1 tablet by mouth Every 4 (Four) Hours As Needed for Severe Pain., Disp: , Rfl:     Review of Systems   Constitutional:  Negative for chills, fatigue and fever.  "  Gastrointestinal:  Negative for blood in stool.   Genitourinary:  Negative for hematuria.   Musculoskeletal:  Positive for arthralgias, gait problem (walker) and myalgias.   All other systems reviewed and are negative.      Social History     Socioeconomic History    Marital status: Single   Tobacco Use    Smoking status: Every Day     Current packs/day: 0.50     Average packs/day: 0.5 packs/day for 35.1 years (17.6 ttl pk-yrs)     Types: Cigarettes     Start date: 1990    Smokeless tobacco: Never    Tobacco comments:     Patient is not inclined but advised to quit smoking   Vaping Use    Vaping status: Never Used   Substance and Sexual Activity    Alcohol use: Yes    Drug use: No    Sexual activity: Defer       Family History   Problem Relation Age of Onset    Lung disease Mother     Heart disease Father     Breast cancer Sister            1/27/2025     9:41 AM   PHQ-2/PHQ-9 Depression Screening   Little interest or pleasure in doing things Not at all   Feeling down, depressed, or hopeless Not at all   How difficult have these problems made it for you to do your work, take care of things at home, or get along with other people? Not difficult at all       Fall Risk Screen:  STEADI Fall Risk Assessment has not been completed.      Objective     /68 (BP Location: Right arm, Patient Position: Sitting, Cuff Size: Large Adult)   Pulse 81   Temp 96.9 °F (36.1 °C) (Temporal)   Resp 18   Ht 182.9 cm (72.01\")   Wt 58.3 kg (128 lb 9.6 oz)   SpO2 95%   BMI 17.44 kg/m²     BP Readings from Last 2 Encounters:   02/11/25 102/68   01/27/25 107/60       Wt Readings from Last 2 Encounters:   02/11/25 58.3 kg (128 lb 9.6 oz)   01/27/25 60.6 kg (133 lb 9.6 oz)                Physical Exam  Vitals and nursing note reviewed.   Constitutional:       General: He is not in acute distress.     Appearance: Normal appearance. He is well-groomed. He is not ill-appearing.   HENT:      Head: Normocephalic and atraumatic.   Eyes: "      General: Lids are normal. Vision grossly intact.   Neck:      Vascular: No carotid bruit.   Cardiovascular:      Rate and Rhythm: Normal rate and regular rhythm.      Heart sounds: Normal heart sounds.   Pulmonary:      Effort: Pulmonary effort is normal.      Breath sounds: Normal breath sounds and air entry.   Musculoskeletal:      Right hip: Decreased range of motion.      Left hip: Normal.      Right lower leg: No edema.      Left lower leg: No edema.   Skin:     General: Skin is warm and dry.   Neurological:      Mental Status: He is alert and oriented to person, place, and time. Mental status is at baseline.   Psychiatric:         Mood and Affect: Mood normal.         Behavior: Behavior normal. Behavior is cooperative.         Thought Content: Thought content normal.       Derm Physical Exam  Physical Exam  Lungs were auscultated.    Result Review :       Results      Assessment and Plan     Plan      Assessment & Plan  Type 2 diabetes mellitus without complication, with long-term current use of insulin      Orders:    Continuous Glucose  (Dexcom G7 ) device; Use 1 Device Every 10 (Ten) Days. One  device to continuously monitor blood sugar for insulin dependent diabetic patient    Closed fracture of neck of right femur, sequela           Assessment & Plan  1. Blood sugar management.  He has been provided with a Dexcom G7 device for continuous glucose monitoring, which was set up by the medical assistant. He has not been checking his blood sugar at home but reports feeling fine and is compliant with his insulin regimen, administering a shot every night. He is not experiencing symptoms of low blood sugar such as hunger, thirst, or shakiness at night. A comprehensive metabolic panel (CMP), complete blood count (CBC), and lipid panel were ordered and drawn in the office today. A prescription for a 90-day supply of the Dexcom G7 device has been issued to ensure he has sufficient  refills. He is advised to contact the office if he encounters any issues with the device.    2. Closed fracture of the neck of the right femur.  He reports significant improvement and is now able to walk around the house without a cane. He has not experienced any falls and has been driving short distances. He is not currently on any pain medication but has tramadol 50 mg available, which he has used sparingly. He is advised to avoid driving until cleared by his surgeon. He has a follow-up appointment with his surgeon on 07/18/2025.       Follow Up   Wrapup Tab  No follow-ups on file.     Patient was given instructions and counseling regarding his condition or for health maintenance advice. Please see specific information pulled into the AVS if appropriate.  Hand hygiene was performed during entrance to exam room and following assessment of patient. This document is intended for medical expert use only.       NELLY Alan, FNP-C  [address] [address]  [facility]  [address] [place] [address] DR CHANDLER DOSS [address] [place] [id number] [phone]    Patient or patient representative verbalized consent for the use of Ambient Listening during the visit with  NELLY Alan for chart documentation. 2/11/2025  11:13 EST

## 2025-02-11 ENCOUNTER — OFFICE VISIT (OUTPATIENT)
Dept: FAMILY MEDICINE CLINIC | Facility: CLINIC | Age: 55
End: 2025-02-11
Payer: MEDICAID

## 2025-02-11 VITALS
SYSTOLIC BLOOD PRESSURE: 102 MMHG | HEIGHT: 72 IN | TEMPERATURE: 96.9 F | WEIGHT: 128.6 LBS | OXYGEN SATURATION: 95 % | RESPIRATION RATE: 18 BRPM | BODY MASS INDEX: 17.42 KG/M2 | DIASTOLIC BLOOD PRESSURE: 68 MMHG | HEART RATE: 81 BPM

## 2025-02-11 DIAGNOSIS — E11.9 TYPE 2 DIABETES MELLITUS WITHOUT COMPLICATION, WITH LONG-TERM CURRENT USE OF INSULIN: Primary | ICD-10-CM

## 2025-02-11 DIAGNOSIS — S72.001S CLOSED FRACTURE OF NECK OF RIGHT FEMUR, SEQUELA: ICD-10-CM

## 2025-02-11 DIAGNOSIS — Z79.4 TYPE 2 DIABETES MELLITUS WITHOUT COMPLICATION, WITH LONG-TERM CURRENT USE OF INSULIN: Primary | ICD-10-CM

## 2025-02-11 PROCEDURE — 3074F SYST BP LT 130 MM HG: CPT

## 2025-02-11 PROCEDURE — 99214 OFFICE O/P EST MOD 30 MIN: CPT

## 2025-02-11 PROCEDURE — 3078F DIAST BP <80 MM HG: CPT

## 2025-02-11 PROCEDURE — 3046F HEMOGLOBIN A1C LEVEL >9.0%: CPT

## 2025-02-11 RX ORDER — ACYCLOVIR 400 MG/1
1 TABLET ORAL
Qty: 9 EACH | Refills: 3 | Status: SHIPPED | OUTPATIENT
Start: 2025-02-11

## 2025-02-11 RX ORDER — TRAMADOL HYDROCHLORIDE 50 MG/1
50 TABLET ORAL EVERY 4 HOURS PRN
COMMUNITY
Start: 2025-01-30

## 2025-02-11 NOTE — ASSESSMENT & PLAN NOTE
Orders:    Continuous Glucose  (Dexcom G7 ) device; Use 1 Device Every 10 (Ten) Days. One  device to continuously monitor blood sugar for insulin dependent diabetic patient

## 2025-02-13 LAB
ALBUMIN SERPL-MCNC: 4.6 G/DL (ref 3.8–4.9)
ALP SERPL-CCNC: 160 IU/L (ref 44–121)
ALT SERPL-CCNC: 17 IU/L (ref 0–44)
AST SERPL-CCNC: 11 IU/L (ref 0–40)
BASOPHILS # BLD AUTO: 0 X10E3/UL (ref 0–0.2)
BASOPHILS NFR BLD AUTO: 1 %
BILIRUB SERPL-MCNC: 0.3 MG/DL (ref 0–1.2)
BUN SERPL-MCNC: 18 MG/DL (ref 6–24)
BUN/CREAT SERPL: 25 (ref 9–20)
CALCIUM SERPL-MCNC: 9.6 MG/DL (ref 8.7–10.2)
CHLORIDE SERPL-SCNC: 97 MMOL/L (ref 96–106)
CHOLEST SERPL-MCNC: 101 MG/DL (ref 100–199)
CO2 SERPL-SCNC: 25 MMOL/L (ref 20–29)
CREAT SERPL-MCNC: 0.73 MG/DL (ref 0.76–1.27)
EGFRCR SERPLBLD CKD-EPI 2021: 107 ML/MIN/1.73
EOSINOPHIL # BLD AUTO: 0.2 X10E3/UL (ref 0–0.4)
EOSINOPHIL NFR BLD AUTO: 4 %
ERYTHROCYTE [DISTWIDTH] IN BLOOD BY AUTOMATED COUNT: 12.3 % (ref 11.6–15.4)
GLOBULIN SER CALC-MCNC: 2.2 G/DL (ref 1.5–4.5)
GLUCOSE SERPL-MCNC: 101 MG/DL (ref 70–99)
HCT VFR BLD AUTO: 42.9 % (ref 37.5–51)
HDLC SERPL-MCNC: 46 MG/DL
HGB BLD-MCNC: 14.1 G/DL (ref 13–17.7)
IMM GRANULOCYTES # BLD AUTO: 0 X10E3/UL (ref 0–0.1)
IMM GRANULOCYTES NFR BLD AUTO: 0 %
LDLC SERPL CALC-MCNC: 35 MG/DL (ref 0–99)
LDLC/HDLC SERPL: 0.8 RATIO (ref 0–3.6)
LYMPHOCYTES # BLD AUTO: 2 X10E3/UL (ref 0.7–3.1)
LYMPHOCYTES NFR BLD AUTO: 34 %
MCH RBC QN AUTO: 31.1 PG (ref 26.6–33)
MCHC RBC AUTO-ENTMCNC: 32.9 G/DL (ref 31.5–35.7)
MCV RBC AUTO: 95 FL (ref 79–97)
MONOCYTES # BLD AUTO: 0.5 X10E3/UL (ref 0.1–0.9)
MONOCYTES NFR BLD AUTO: 7 %
NEUTROPHILS # BLD AUTO: 3.3 X10E3/UL (ref 1.4–7)
NEUTROPHILS NFR BLD AUTO: 54 %
PLATELET # BLD AUTO: 217 X10E3/UL (ref 150–450)
POTASSIUM SERPL-SCNC: 4.2 MMOL/L (ref 3.5–5.2)
PROT SERPL-MCNC: 6.8 G/DL (ref 6–8.5)
RBC # BLD AUTO: 4.53 X10E6/UL (ref 4.14–5.8)
SODIUM SERPL-SCNC: 140 MMOL/L (ref 134–144)
TRIGL SERPL-MCNC: 107 MG/DL (ref 0–149)
VLDLC SERPL CALC-MCNC: 20 MG/DL (ref 5–40)
WBC # BLD AUTO: 6.1 X10E3/UL (ref 3.4–10.8)

## 2025-02-25 DIAGNOSIS — E11.65 TYPE 2 DIABETES MELLITUS WITH HYPERGLYCEMIA, WITHOUT LONG-TERM CURRENT USE OF INSULIN: ICD-10-CM

## 2025-02-25 RX ORDER — INSULIN GLARGINE 100 [IU]/ML
10 INJECTION, SOLUTION SUBCUTANEOUS NIGHTLY
Qty: 9 ML | Refills: 0 | Status: SHIPPED | OUTPATIENT
Start: 2025-02-25

## 2025-02-25 NOTE — TELEPHONE ENCOUNTER
Caller: Winston Benites    Relationship: Self    Best call back number:     [phone]       Requested Prescriptions:   Requested Prescriptions     Pending Prescriptions Disp Refills    Insulin Glargine (Lantus SoloStar) 100 UNIT/ML injection pen 9 mL 0     Sig: Inject 10 Units under the skin into the appropriate area as directed Every Night.        Pharmacy where request should be sent: [facility] [address] [place] [address] [address] - [phone]  - [phone]     Last office visit with prescribing clinician: 2/11/2025   Last telemedicine visit with prescribing clinician: Visit date not found   Next office visit with prescribing clinician: Visit date not found     Additional details provided by patient: 90 DAY SUPPLY    Does the patient have less than a 3 day supply:  [] Yes  [x] No    Would you like a call back once the refill request has been completed: [] Yes [] No    If the office needs to give you a call back, can they leave a voicemail: [] Yes [] No    Eva Mcdonald Rep   02/25/25 16:12 EST

## 2025-03-24 NOTE — TELEPHONE ENCOUNTER
Caller: Winston Benites    Relationship: Self    Best call back number: [phone]     Requested Prescriptions:   Requested Prescriptions     Pending Prescriptions Disp Refills    Continuous Glucose Sensor (Dexcom G7 Sensor) misc 9 each 3     Sig: Use 1 package Every 10 (Ten) Days. Use with dexcom  to continuously monitor blood sugar for insulin dependent diabetic patient        Pharmacy where request should be sent: [facility] [address] [place] [address] [address] [facility] [address]  - [phone] [facility] [phone] FX     Last office visit with prescribing clinician: 2/11/2025   Last telemedicine visit with prescribing clinician: Visit date not found   Next office visit with prescribing clinician: Visit date not found     Additional details provided by patient: PATIENT IS ASKING FOR A 90 DAY SUPPLY.    Does the patient have less than a 3 day supply:  [x] Yes  [] No    Would you like a call back once the refill request has been completed: [] Yes [] No    If the office needs to give you a call back, can they leave a voicemail: [] Yes [] No    April Eva Jiménez Rep   03/24/25 15:35 EDT

## 2025-03-25 RX ORDER — ACYCLOVIR 400 MG/1
1 TABLET ORAL
Qty: 9 EACH | Refills: 3 | Status: SHIPPED | OUTPATIENT
Start: 2025-03-25

## 2025-05-05 ENCOUNTER — TELEPHONE (OUTPATIENT)
Dept: FAMILY MEDICINE CLINIC | Facility: CLINIC | Age: 55
End: 2025-05-05

## 2025-05-05 NOTE — TELEPHONE ENCOUNTER
Caller: Abel Benites    Relationship: Self    Best call back number: [phone]     What is the best time to reach you: ANY    Who are you requesting to speak with (clinical staff, provider,  specific staff member): -MESSAGE OF LYNN    Do you know the name of the person who called: ABEL    What was the call regarding: CANCELLATION OF PATIENT'S APPOINTMENT DUE TO INSURANCE. LEFT MESSAGE FOR CALL TO PATIENT'S NUMBER